# Patient Record
Sex: FEMALE | Race: WHITE | URBAN - METROPOLITAN AREA
[De-identification: names, ages, dates, MRNs, and addresses within clinical notes are randomized per-mention and may not be internally consistent; named-entity substitution may affect disease eponyms.]

---

## 2017-12-01 ENCOUNTER — HOSPITAL ENCOUNTER (INPATIENT)
Facility: CLINIC | Age: 72
LOS: 6 days | Discharge: HOME HEALTH | DRG: 029 | End: 2017-12-07
Attending: INTERNAL MEDICINE | Admitting: INTERNAL MEDICINE
Payer: MEDICARE

## 2017-12-01 ENCOUNTER — TRANSFERRED RECORDS (OUTPATIENT)
Dept: HEALTH INFORMATION MANAGEMENT | Facility: CLINIC | Age: 72
End: 2017-12-01

## 2017-12-01 DIAGNOSIS — E87.6 HYPOKALEMIA: ICD-10-CM

## 2017-12-01 DIAGNOSIS — G06.2 EPIDURAL ABSCESS: Primary | ICD-10-CM

## 2017-12-01 PROBLEM — T14.8XXA HEMATOMA: Status: ACTIVE | Noted: 2017-12-01

## 2017-12-01 LAB
GLUCOSE BLDC GLUCOMTR-MCNC: 184 MG/DL (ref 70–99)
GLUCOSE BLDC GLUCOMTR-MCNC: 209 MG/DL (ref 70–99)

## 2017-12-01 PROCEDURE — 99223 1ST HOSP IP/OBS HIGH 75: CPT | Mod: AI | Performed by: PHYSICIAN ASSISTANT

## 2017-12-01 PROCEDURE — 87040 BLOOD CULTURE FOR BACTERIA: CPT | Performed by: PHYSICIAN ASSISTANT

## 2017-12-01 PROCEDURE — 25000131 ZZH RX MED GY IP 250 OP 636 PS 637: Mod: GY | Performed by: PHYSICIAN ASSISTANT

## 2017-12-01 PROCEDURE — 00000146 ZZHCL STATISTIC GLUCOSE BY METER IP

## 2017-12-01 PROCEDURE — 12000000 ZZH R&B MED SURG/OB

## 2017-12-01 PROCEDURE — 36415 COLL VENOUS BLD VENIPUNCTURE: CPT | Performed by: PHYSICIAN ASSISTANT

## 2017-12-01 RX ORDER — PROCHLORPERAZINE MALEATE 5 MG
5 TABLET ORAL EVERY 6 HOURS PRN
Status: DISCONTINUED | OUTPATIENT
Start: 2017-12-01 | End: 2017-12-07 | Stop reason: HOSPADM

## 2017-12-01 RX ORDER — LISINOPRIL 40 MG/1
40 TABLET ORAL DAILY
Status: DISCONTINUED | OUTPATIENT
Start: 2017-12-02 | End: 2017-12-05

## 2017-12-01 RX ORDER — NALOXONE HYDROCHLORIDE 0.4 MG/ML
.1-.4 INJECTION, SOLUTION INTRAMUSCULAR; INTRAVENOUS; SUBCUTANEOUS
Status: DISCONTINUED | OUTPATIENT
Start: 2017-12-01 | End: 2017-12-07

## 2017-12-01 RX ORDER — POLYETHYLENE GLYCOL 3350 17 G/17G
17 POWDER, FOR SOLUTION ORAL DAILY PRN
Status: DISCONTINUED | OUTPATIENT
Start: 2017-12-01 | End: 2017-12-07 | Stop reason: HOSPADM

## 2017-12-01 RX ORDER — AMOXICILLIN 250 MG
2 CAPSULE ORAL 2 TIMES DAILY PRN
Status: DISCONTINUED | OUTPATIENT
Start: 2017-12-01 | End: 2017-12-07 | Stop reason: HOSPADM

## 2017-12-01 RX ORDER — FUROSEMIDE 40 MG
40 TABLET ORAL DAILY
Status: DISCONTINUED | OUTPATIENT
Start: 2017-12-02 | End: 2017-12-04

## 2017-12-01 RX ORDER — CLONIDINE HYDROCHLORIDE 0.1 MG/1
0.1 TABLET ORAL 2 TIMES DAILY
Status: DISCONTINUED | OUTPATIENT
Start: 2017-12-02 | End: 2017-12-07 | Stop reason: HOSPADM

## 2017-12-01 RX ORDER — CARVEDILOL 25 MG/1
25 TABLET ORAL 2 TIMES DAILY
Status: DISCONTINUED | OUTPATIENT
Start: 2017-12-02 | End: 2017-12-07 | Stop reason: HOSPADM

## 2017-12-01 RX ORDER — ACETAMINOPHEN 650 MG/1
650 SUPPOSITORY RECTAL EVERY 4 HOURS PRN
Status: DISCONTINUED | OUTPATIENT
Start: 2017-12-01 | End: 2017-12-07 | Stop reason: HOSPADM

## 2017-12-01 RX ORDER — HYDRALAZINE HYDROCHLORIDE 20 MG/ML
10 INJECTION INTRAMUSCULAR; INTRAVENOUS EVERY 4 HOURS PRN
Status: DISCONTINUED | OUTPATIENT
Start: 2017-12-01 | End: 2017-12-07 | Stop reason: HOSPADM

## 2017-12-01 RX ORDER — ACETAMINOPHEN 325 MG/1
650 TABLET ORAL EVERY 4 HOURS PRN
Status: DISCONTINUED | OUTPATIENT
Start: 2017-12-01 | End: 2017-12-07 | Stop reason: HOSPADM

## 2017-12-01 RX ORDER — NIFEDIPINE 90 MG/1
90 TABLET, EXTENDED RELEASE ORAL DAILY
Status: DISCONTINUED | OUTPATIENT
Start: 2017-12-02 | End: 2017-12-07 | Stop reason: HOSPADM

## 2017-12-01 RX ORDER — BISACODYL 10 MG
10 SUPPOSITORY, RECTAL RECTAL DAILY PRN
Status: DISCONTINUED | OUTPATIENT
Start: 2017-12-01 | End: 2017-12-07 | Stop reason: HOSPADM

## 2017-12-01 RX ORDER — HYDROMORPHONE HYDROCHLORIDE 1 MG/ML
0.2 INJECTION, SOLUTION INTRAMUSCULAR; INTRAVENOUS; SUBCUTANEOUS
Status: DISCONTINUED | OUTPATIENT
Start: 2017-12-01 | End: 2017-12-04

## 2017-12-01 RX ORDER — PROCHLORPERAZINE 25 MG
12.5 SUPPOSITORY, RECTAL RECTAL EVERY 12 HOURS PRN
Status: DISCONTINUED | OUTPATIENT
Start: 2017-12-01 | End: 2017-12-07 | Stop reason: HOSPADM

## 2017-12-01 RX ORDER — NICOTINE POLACRILEX 4 MG
15-30 LOZENGE BUCCAL
Status: DISCONTINUED | OUTPATIENT
Start: 2017-12-01 | End: 2017-12-07 | Stop reason: HOSPADM

## 2017-12-01 RX ORDER — DEXTROSE MONOHYDRATE 25 G/50ML
25-50 INJECTION, SOLUTION INTRAVENOUS
Status: DISCONTINUED | OUTPATIENT
Start: 2017-12-01 | End: 2017-12-07 | Stop reason: HOSPADM

## 2017-12-01 RX ORDER — OXYCODONE HYDROCHLORIDE 5 MG/1
5-10 TABLET ORAL
Status: DISCONTINUED | OUTPATIENT
Start: 2017-12-01 | End: 2017-12-07 | Stop reason: HOSPADM

## 2017-12-01 RX ORDER — AMOXICILLIN 250 MG
1 CAPSULE ORAL 2 TIMES DAILY PRN
Status: DISCONTINUED | OUTPATIENT
Start: 2017-12-01 | End: 2017-12-07 | Stop reason: HOSPADM

## 2017-12-01 RX ORDER — ONDANSETRON 2 MG/ML
4 INJECTION INTRAMUSCULAR; INTRAVENOUS EVERY 6 HOURS PRN
Status: DISCONTINUED | OUTPATIENT
Start: 2017-12-01 | End: 2017-12-07 | Stop reason: HOSPADM

## 2017-12-01 RX ORDER — ONDANSETRON 4 MG/1
4 TABLET, ORALLY DISINTEGRATING ORAL EVERY 6 HOURS PRN
Status: DISCONTINUED | OUTPATIENT
Start: 2017-12-01 | End: 2017-12-07 | Stop reason: HOSPADM

## 2017-12-01 RX ADMIN — INSULIN ASPART 1 UNITS: 100 INJECTION, SOLUTION INTRAVENOUS; SUBCUTANEOUS at 22:29

## 2017-12-01 ASSESSMENT — VISUAL ACUITY: OU: NORMAL ACUITY

## 2017-12-01 ASSESSMENT — ACTIVITIES OF DAILY LIVING (ADL): ADLS_ACUITY_SCORE: 11

## 2017-12-01 NOTE — IP AVS SNAPSHOT
MRN:[id number]                      After Visit Summary   12/1/2017    Mary Baker    MRN: [id number]           Thank you!     Thank you for choosing [facility] for your care. Our goal is always to provide you with excellent care. Hearing back from our patients is one way we can continue to improve our services. Please take a few minutes to complete the written survey that you may receive in the mail after you visit with us. Thank you!        Patient Information     Date Of Birth          1945        Designated Caregiver       Most Recent Value    Caregiver    Will someone help with your care after discharge? yes    Name of designated caregiver deuce    Phone number of caregiver [phone]    Caregiver address [id number] [place] [address]      About your hospital stay     You were admitted on:  December 1, 2017 You last received care in the:  [facility] [address] [facility]    You were discharged on:  December 7, 2017        Reason for your hospital stay       For surgical treatment of epidural spine abscess                  Who to Call     For medical emergencies, please call 911.  For non-urgent questions about your medical care, please call your primary care provider or clinic, [phone]  For questions related to your surgery, please call your surgery clinic        Attending Provider     Provider Specialty    Elbert Reed MD Internal Medicine    Magaly Hoover MD Internal Medicine    Vishal Jay MD Orthopedics       Primary Care Provider Office Phone # Fax #    Vishal Gracia [phone]      After Care Instructions     Activity       Your activity upon discharge: follow Dr. Jay d/c instruction sheet            Diet       Follow this diet upon discharge: Orders Placed This Encounter      Moderate Consistent CHO Diet            Discharge Instructions           Supplies       List the supplies the pt needs to go  home  4x4s and tape            Wound care and dressings       Instructions to care for your wound at home: change dressing daily, keep clean and dry, follow up with Dr. Jay in 2 weeks for staple removal, wear brace when up                  Follow-up Appointments     Follow Up and recommended labs and tests       Follow up with primary care provider, Vishal Gracia, within 7 days for hospital follow- up.  The following labs/tests are recommended: CBC and BMP.            Follow Up and recommended labs and tests       Your appointments are scheduled as follows (you need weekly CBC & BMPs, so please be sure the doctors write orders to have these drawn at the appointment):    Wed Dec 13 at 8:30am   Dr. Anthony, Primary Care     [facility]     [address]     [place] [address]     [phone]    Wed Dec 20 at 10:45am  Dr. Perez, Infectious Disease     [facility]     [address]      [place] [address] [phone]    Thurs Dec 21 at 9:40am   Dr. Jay, Orthopedic Surgery     [address] [place] [address], [address]     [place], [place]   [phone]            Follow-up and recommended labs and tests        Follow up with Dr. Perez of ID , at (location with clinic name or city) [facility], within 1-2 weeks  for hospital follow- up. The following labs/tests are recommended: CBC.                  Additional Services     Home infusion referral       Your provider has referred you to: [facility]: [facility] [phone]   [email or web address]    Local Address (if different from home address):     Anticipated Length of Therapy: 6-8 weeks    Home Infusion Pharmacist to adjust therapy based on labs and clinical assessments: Yes    Labs:  May draw labs from Venous Catheter: Yes  Home Infusion Pharmacist to order labs based on therapy type and clinical assessments: Yes  Call/Fax Lab Results to: Dr. Perez of ID    Agency Staff to assess nursing needs  for Infusion Therapy.    Access Device Management:  IV Access Type: PICC  Flush with Heparin and Normal Saline IVP PRN and routine site care (per agency protocol) to maintain access device? Yes                  Further instructions from your care team       Your doctor has ordered IV antibiotics after your hospital stay.  This service will be provided by [facility]. They will contact you regarding your first visit.   If you have any questions about this service, please call them at [phone].    A follow-up appointment was scheduled for you [see above].  It is very important to go to it--bring these papers and your insurance card with you.  At the visit, talk about your hospital stay.  Tell your doctor how you feel.  Show your new list of medications.  Your doctor will update records, make sure you are still doing OK, and decide if any tests or medication changes are needed.                  Care after a Discectomy/Decompression - Dr. Vishal Jay    The following information will help you through your recovery at home.  Pain    It is normal for you to experience some pain in the area of your incision after your surgery.  Some of your leg pain may go away immediately after your surgery.  Some of the pain will gradually decrease over the next few days or weeks depending on the amount of inflammation present in the nerve.      Sometimes Dr. Jay will place a steroid preparation on the nerve during surgery.   This may help decrease the nerve inflammation for the first few days after surgery.  If some of your leg pain returns 3 to 5 days after surgery it may be due to the steroid wearing off.  The pain should not be as bad as your pre-op pain and will diminish over a period of weeks.      You should call Dr. Jay s office if your leg pain returns suddenly and does not improve over 24 hours.    Activity    You may increase your activity as tolerated; walking is the best form of exercise after spine  surgery.      Avoid bending, lifting, and twisting (BLT).  Avoid activities such as vacuuming, raking and shoveling.    Try to limit your lifting to 10-15lbs during the first 2 weeks and then increase to 20-25lbs over the next 6 weeks.    You may return to work approximately 1- 2 weeks after your surgery, if you have a sedentary or desk type job.  If you have a physical job Dr. Jay and his staff will help you determine a return to work plan.      You may resume sexual activity when you feel ready.  Stop if you have pain.    Driving    You may drive if you feel strong enough and are not taking any narcotic pain medications.    Incision Site     The first 3 days after surgery Dr. Jay would like you to keep your incision dry.   You may take a sponge bath during this time or cover your dressing with a transparent material called Tegaderm (sold at most pharmacies).      The first 3 days after surgery you should change your gauze dressing at least once a day.  After 3 days you may remove the gauze dressing and leave it off, at this point you may shower without covering your incision, allow water and soap to run over your incision but do not scrub the area or soak in a tub.    Your incision is covered with steri-strips (narrow white tapes); they will get loose and fall of in 7-10 days.  If they do not fall off after 10 days you may remove them or Dr. Jay s staff will remove them at your post-op visit.    Most patients have dissolvable stitches which do not need to be removed.  In some cases nylon stitches are used and they need to be removed, 10-14 days after surgery.  If you have staples or nylon sutures please call for a removal appointment with Dr. Jay s nurse.    Pain Management     Take your prescribed pain medication as needed and directed.  Use Tylenol and Ibuprofen for your discomfort when you no longer need the narcotic pain medication.      If you need a refill on your pain medication call [phone],  please allow 24 hours for your prescription to be refilled, Dr. Jay s office does not refill pain medications on Friday.   Diet     Eat a healthy diet; this will help your recovery.    Drink plenty of fluids, water, milk or juice.    If you have trouble with constipation you should eat more fiber, drink more fluids, increase your walking or try an over the counter laxative.    Follow-up Visits    You should have a post-op appointment that was scheduled for you at the same time your surgery was scheduled. If you do not, please call Dr. Pierre gonzalez office when you get home from your surgery.    Write down any questions you have about your surgery, recovery, return to work and other topics you wished to be covered at your post-op visit.  This way, we will be able to address all of your questions at your next visit.    Call Dr. Pierre gonzalez office if you have any questions or concerns.    When to Call your Doctor:    If you have any redness, warmth or swelling at the incision site.    If your incision opens up.    If you have increasing drainage from your incision.    If you have a temperature greater than 100.5 degrees Fahrenheit.    [address] [place] [address]  Phone: [phone]  Fax: [phone]  Web site: [email or web address]      Pending Results     Date and Time Order Name Status Description    12/2/2017 1314 Anaerobic bacterial culture Preliminary     12/2/2017 1314 Abscess Culture Aerobic Bacterial Preliminary             Statement of Approval     Ordered          12/07/17 1031  I have reviewed and agree with all the recommendations and orders detailed in this document.  EFFECTIVE NOW     Approved and electronically signed by:  Dejah Mesa PA-C             Admission Information     Date & Time Provider Department Dept. Phone    12/1/2017 Vishal Jay MD [facility] [address] [facility] [phone]      Your Vitals Were     Blood Pressure Pulse Temperature Respirations Height Weight     "156/83 (BP Location: Left arm) 76 98  F (36.7  C) (Oral) 16 1.753 m (5' 9\") 109 kg (240 lb 4.8 oz)    Pulse Oximetry BMI (Body Mass Index)                98% 35.49 kg/m2          The Training Room (TTR) Information     The Training Room (TTR) lets you send messages to your doctor, view your test results, renew your prescriptions, schedule appointments and more. To sign up, go to [email or web address] ([email or web address]) . Click on \"Log in\" on the left side of the screen, which will take you to the Welcome page. Then click on \"Sign up Now\" on the right side of the page.     You will be asked to enter the access code listed below, as well as some personal information. Please follow the directions to create your username and password.     Your access code is: [email or web address]  Expires: 3/6/2018 11:06 AM     Your access code will  in 90 days. If you need help or a new code, please call your [place] clinic or [phone].        Care EveryWhere ID     This is your Care EveryWhere ID. This could be used by other organizations to access your [place] medical records  [id number]        Equal Access to Services     NIRAV RINCON : Lux Remy, leigh hart, qaaliyah kaalmada celso, david wheatley. So [facility] [phone].    ATENCIÓN: Si habla español, tiene a saravia disposición servicios gratuitos de asistencia lingüística. Llame al [phone].    We comply with applicable federal civil rights laws and [place] laws. We do not discriminate on the basis of race, color, national origin, age, disability, sex, sexual orientation, or gender identity.               Review of your medicines      START taking        Dose / Directions    Nafcillin Sodium 2 G Solr        Dose:  2 g   Inject 2 g into the vein every 4 hours weekly CBC, creatinine and ALT faxed to [phone]   Refills:  0       oxyCODONE IR 5 MG tablet   Commonly known as:  ROXICODONE        Dose:  5-10 mg   Take 1-2 tablets (5-10 mg) by mouth every 3 hours as needed " for moderate to severe pain   Quantity:  25 tablet   Refills:  0       Potassium Chloride ER 20 MEQ Tbcr   Used for:  Hypokalemia        Dose:  20 mEq   Take 1 tablet (20 mEq) by mouth daily   Quantity:  30 tablet   Refills:  0       rifampin 300 MG capsule   Commonly known as:  RIFADIN        Dose:  300 mg   Take 1 capsule (300 mg) by mouth 2 times daily   Quantity:  60 capsule   Refills:  1         CONTINUE these medicines which have NOT CHANGED        Dose / Directions    ACCU-CHEK SHANTEL test strip   Used for:  Type II or unspecified type diabetes mellitus without mention of complication, not stated as uncontrolled   Generic drug:  blood glucose monitoring        Pt tests 4 times daily before meals and nightly   Quantity:  450 strip   Refills:  prn       aspirin 81 MG tablet   Notes to Patient:  Resume 12/8        Dose:  1 tablet   Take 1 tablet by mouth every evening   Refills:  3       Blood Pressure Monitor Kit   Used for:  Hypertension        Dose:  1 Device   1 Device daily. Check BP daily   Quantity:  1 kit   Refills:  0       CARVEDILOL PO   Notes to Patient:  12/7 at bedtime        Dose:  25 mg   Take 25 mg by mouth 2 times daily In the Afternoon and at Bedtime   Refills:  0       * CLONIDINE HCL PO        Dose:  0.1 mg   Take 0.1 mg by mouth daily Patient takes 0.1mg in the afternoon and 0.2mg at bedtime   Refills:  0       * CLONIDINE HCL PO        Dose:  0.2 mg   Take 0.2 mg by mouth At Bedtime Patient takes 0.1mg in the afternoon and 0.2mg at bedtime   Refills:  0       FLUOXETINE HCL PO   Notes to Patient:  12/8        Dose:  20 mg   Take 20 mg by mouth daily   Refills:  0       FUROSEMIDE PO        Dose:  40 mg   Take 40 mg by mouth daily (Takes 2 x 20mg tablet)   Refills:  0       insulin pen needle 31G X 8 MM   Commonly known as:  ULTICARE SHORT   Used for:  Diabetes mellitus, type 2 (H)        Use BID   Quantity:  100 each   Refills:  11       LANTUS SOLOSTAR 100 UNIT/ML injection   Used for:   Type 2 diabetes, HbA1c goal < 7% (H)   Generic drug:  insulin glargine   Notes to Patient:  12/8        Dose:  47 Units   Inject 47 Units Subcutaneous every morning   Quantity:  1 Month   Refills:  5       LEVOTHYROXINE SODIUM PO        Dose:  175 mcg   Take 175 mcg by mouth daily   Refills:  0       LORAZEPAM PO        Dose:  1 mg   Take 1 mg by mouth 2 times daily as needed   Refills:  0       METFORMIN HCL PO        Dose:  1000 mg   Take 1,000 mg by mouth 2 times daily (with meals) takes 2 x 500mg tablets   Refills:  0       OMEGA-3 FISH OIL PO        Dose:  1 capsule   Take 1 capsule by mouth twice a week   Refills:  0       PRAVASTATIN SODIUM PO        Dose:  40 mg   Take 40 mg by mouth every evening   Refills:  0       TYLENOL PO        Dose:  1500 mg   Take 1,500 mg by mouth daily as needed for mild pain or fever   Refills:  0       * Notice:  This list has 2 medication(s) that are the same as other medications prescribed for you. Read the directions carefully, and ask your doctor or other care provider to review them with you.      STOP taking     BENAZEPRIL HCL PO                Where to get your medicines      These medications were sent to [facility] [place]  [place], [place] - [address]  [address] [facility] [address], [facility] [address]     Phone:  [phone]     Potassium Chloride ER 20 MEQ Tbcr    rifampin 300 MG capsule         Some of these will need a paper prescription and others can be bought over the counter. Ask your nurse if you have questions.     Bring a paper prescription for each of these medications     oxyCODONE IR 5 MG tablet       You don't need a prescription for these medications     Nafcillin Sodium 2 G Solr               ANTIBIOTIC INSTRUCTION     You've Been Prescribed an Antibiotic - Now What?  Your healthcare team thinks that you or your loved one might have an infection. Some infections can be treated with antibiotics, which are powerful, life-saving drugs. Like all  medications, antibiotics have side effects and should only be used when necessary. There are some important things you should know about your antibiotic treatment.      Your healthcare team may run tests before you start taking an antibiotic.    Your team may take samples (e.g., from your blood, urine or other areas) to run tests to look for bacteria. These test can be important to determine if you need an antibiotic at all and, if you do, which antibiotic will work best.      Within a few days, your healthcare team might change or even stop your antibiotic.    Your team may start you on an antibiotic while they are working to find out what is making you sick.    Your team might change your antibiotic because test results show that a different antibiotic would be better to treat your infection.    In some cases, once your team has more information, they learn that you do not need an antibiotic at all. They may find out that you don't have an infection, or that the antibiotic you're taking won't work against your infection. For example, an infection caused by a virus can't be treated with antibiotics. Staying on an antibiotic when you don't need it is more likely to be harmful than helpful.      You may experience side effects from your antibiotic.    Like all medications, antibiotics have side effects. Some of these can be serious.    Let you healthcare team know if you have any known allergies when you are admitted to the hospital.    One significant side effect of nearly all antibiotics is the risk of severe and sometimes deadly diarrhea caused by Clostridium difficile (C. Difficile). This occurs when a person takes antibiotics because some good germs are destroyed. Antibiotic use allows C. diificile to take over, putting patients at high risk for this serious infection.    As a patient or caregiver, it is important to understand your or your loved one's antibiotic treatment. It is especially important for  caregivers to speak up when patients can't speak for themselves. Here are some important questions to ask your healthcare team.    What infection is this antibiotic treating and how do you know I have that infection?    What side effects might occur from this antibiotic?    How long will I need to take this antibiotic?    Is it safe to take this antibiotic with other medications or supplements (e.g., vitamins) that I am taking?     Are there any special directions I need to know about taking this antibiotic? For example, should I take it with food?    How will I be monitored to know whether my infection is responding to the antibiotic?    What tests may help to make sure the right antibiotic is prescribed for me?      Information provided by:  www.cdc.gov/getsmart  U.S. Department of Health and Human Services  Centers for disease Control and Prevention  National Center for Emerging and Zoonotic Infectious Diseases  Division of Healthcare Quality Promotion         Protect others around you: Learn how to safely use, store and throw away your medicines at www.disposemymeds.org.             Medication List: This is a list of all your medications and when to take them. Check marks below indicate your daily home schedule. Keep this list as a reference.      Medications           Morning Afternoon Evening Bedtime As Needed    ACCU-CHEK SHANTEL test strip   Pt tests 4 times daily before meals and nightly   Generic drug:  blood glucose monitoring                                aspirin 81 MG tablet   Take 1 tablet by mouth every evening   Notes to Patient:  Resume 12/8                                   Blood Pressure Monitor Kit   1 Device daily. Check BP daily                                CARVEDILOL PO   Take 25 mg by mouth 2 times daily In the Afternoon and at Bedtime   Last time this was given:  25 mg on 12/7/2017  8:17 AM   Notes to Patient:  12/7 at bedtime                                      * CLONIDINE HCL PO   Take  0.1 mg by mouth daily Patient takes 0.1mg in the afternoon and 0.2mg at bedtime   Last time this was given:  0.1 mg on 12/7/2017  8:17 AM                                * CLONIDINE HCL PO   Take 0.2 mg by mouth At Bedtime Patient takes 0.1mg in the afternoon and 0.2mg at bedtime   Last time this was given:  0.1 mg on 12/7/2017  8:17 AM   Next Dose Due:  12/7 at bedtime                                      FLUOXETINE HCL PO   Take 20 mg by mouth daily   Last time this was given:  20 mg on 12/7/2017  8:17 AM   Notes to Patient:  12/8                                   FUROSEMIDE PO   Take 40 mg by mouth daily (Takes 2 x 20mg tablet)   Last time this was given:  40 mg on 12/3/2017  8:29 AM   Next Dose Due:  Resume 12/8                                   insulin pen needle 31G X 8 MM   Commonly known as:  ULTICARE SHORT   Use BID                                LANTUS SOLOSTAR 100 UNIT/ML injection   Inject 47 Units Subcutaneous every morning   Generic drug:  insulin glargine   Notes to Patient:  12/8                                   LEVOTHYROXINE SODIUM PO   Take 175 mcg by mouth daily   Last time this was given:  175 mcg on 12/7/2017  8:17 AM   Next Dose Due:  12/8                                   LORAZEPAM PO   Take 1 mg by mouth 2 times daily as needed   Next Dose Due:  Available as needed                                   METFORMIN HCL PO   Take 1,000 mg by mouth 2 times daily (with meals) takes 2 x 500mg tablets   Next Dose Due:  12/7 with dinner                                        Nafcillin Sodium 2 G Solr   Inject 2 g into the vein every 4 hours weekly CBC, creatinine and ALT faxed to [phone]   Last time this was given:  12/7 at 12:20 pm   Next Dose Due:  12/7 4 pm                                            OMEGA-3 FISH OIL PO   Take 1 capsule by mouth twice a week   Next Dose Due:  Resume home schedule                                oxyCODONE IR 5 MG tablet   Commonly known as:  ROXICODONE   Take 1-2  tablets (5-10 mg) by mouth every 3 hours as needed for moderate to severe pain   Last time this was given:  5 mg on 12/5/2017  2:28 AM   Next Dose Due:  Available as needed                                   Potassium Chloride ER 20 MEQ Tbcr   Take 1 tablet (20 mEq) by mouth daily   Last time this was given:  12/7   Next Dose Due:  12/8                                   PRAVASTATIN SODIUM PO   Take 40 mg by mouth every evening   Next Dose Due:  12/7 at bedtime                                   rifampin 300 MG capsule   Commonly known as:  RIFADIN   Take 1 capsule (300 mg) by mouth 2 times daily   Next Dose Due:  Start this evening (12/7) and then take 2x a day                                      TYLENOL PO   Take 1,500 mg by mouth daily as needed for mild pain or fever   Last time this was given:  650 mg on 12/5/2017  9:16 PM   Next Dose Due:  Available as needed                                   * Notice:  This list has 2 medication(s) that are the same as other medications prescribed for you. Read the directions carefully, and ask your doctor or other care provider to review them with you.

## 2017-12-01 NOTE — LETTER
Transition Communication Hand-off for Care Transitions to Next Level of Care Provider    Name: Mary Baker  MRN #: [id number]  Primary Care Provider: Vishal Gracia     Primary Clinic: [facility] [address] [place] [address]     Reason for Hospitalization:  Spinal epidural abscess  Hematoma  EPIDURAL ABCESS  Epidural abscess  UNKNOWN  EPIDURAL ABCESS STATUS POST OP,PREVIOUS I&D  Admit Date/Time: 12/1/2017  6:40 PM  Discharge Date: 12/07/17  Payor Source: Payor: BCBS / Plan: BCBS PLATINUM BLUE / Product Type: PPO /   Discharge Plan:  Discharge Plan:       Most Recent Value    Disposition Comments home with q4h IV abx    Concerns To Be Addressed discharge planning concerns, financial/insurance concerns         Discharge Needs Assessment:  Needs       Most Recent Value    Transportation Available family or friend will provide, car    # of Referrals Placed by [facility], Scheduled Follow-up appointments, Specialty Providers, External Care Coordination, MTM, Communication hand-offs to next level of Care Providers        Follow-up specialty is recommended: Yes--PCP, Infectious Disease, and Spinal Surgeon  Follow-up plan:  future appointments.  Your appointments are scheduled as follows (you need weekly CBC & BMPs, so please be sure the doctors write orders to have these drawn at the appointment):     Wed Dec 13 at 8:30am   Dr. Anthony, Primary Care      [facility]      [address] [place] [address]    [phone]     Wed Dec 20 at 10:45am   Dr. Perez, Infectious Disease      [facility]      [address] [facility] [address]      [facility] [address] [phone]     Thurs Dec 21 at 9:40am   Dr. Jay, Orthopedic Surgery      [address] [place] [address], [address]      [place], [place]   [phone]     Any outstanding tests or procedures:        Referrals     Future Labs/Procedures    Home infusion referral     Comments:    Your provider has referred you to: [facility]: [facility]  [facility] [phone]   [email or web address]    Local Address (if different from home address):     Anticipated Length of Therapy: 6-8 weeks    Home Infusion Pharmacist to adjust therapy based on labs and clinical assessments: Yes    Labs:  May draw labs from Venous Catheter: Yes  Home Infusion Pharmacist to order labs based on therapy type and clinical assessments: Yes  Call/Fax Lab Results to: Dr. Perez of ID    Agency Staff to assess nursing needs for Infusion Therapy.    Access Device Management:  IV Access Type: PICC  Flush with Heparin and Normal Saline IVP PRN and routine site care (per agency protocol) to maintain access device? Yes    MED THERAPY MANAGE REFERRAL     Comments:    Patient has diagnosis of Diabetes, Heart Failure, COPD, or AMI and is on more than 5 medications          Key Recommendations:  Please be aware of pt's recent admission.     Joselin Mckeon RN BSN PHN   [facility] Care Coordination    AVS/Discharge Summary is the source of truth; this is a helpful guide for improved communication of patient story

## 2017-12-01 NOTE — IP AVS SNAPSHOT
[address] [facility]    [address]    [place] [address]    Phone:  [phone]                                       After Visit Summary   12/1/2017    Mary Baker    MRN: [id number]           After Visit Summary Signature Page     I have received my discharge instructions, and my questions have been answered. I have discussed any challenges I see with this plan with the nurse or doctor.    ..........................................................................................................................................  Patient/Patient Representative Signature      ..........................................................................................................................................  Patient Representative Print Name and Relationship to Patient    ..................................................               ................................................  Date                                            Time    ..........................................................................................................................................  Reviewed by Signature/Title    ...................................................              ..............................................  Date                                                            Time

## 2017-12-02 ENCOUNTER — ANESTHESIA EVENT (OUTPATIENT)
Dept: SURGERY | Facility: CLINIC | Age: 72
DRG: 029 | End: 2017-12-02
Payer: MEDICARE

## 2017-12-02 ENCOUNTER — APPOINTMENT (OUTPATIENT)
Dept: CT IMAGING | Facility: CLINIC | Age: 72
DRG: 029 | End: 2017-12-02
Attending: ORTHOPAEDIC SURGERY
Payer: MEDICARE

## 2017-12-02 ENCOUNTER — APPOINTMENT (OUTPATIENT)
Dept: GENERAL RADIOLOGY | Facility: CLINIC | Age: 72
DRG: 029 | End: 2017-12-02
Attending: ORTHOPAEDIC SURGERY
Payer: MEDICARE

## 2017-12-02 ENCOUNTER — ANESTHESIA (OUTPATIENT)
Dept: SURGERY | Facility: CLINIC | Age: 72
DRG: 029 | End: 2017-12-02
Payer: MEDICARE

## 2017-12-02 PROBLEM — G06.2 EPIDURAL ABSCESS: Status: ACTIVE | Noted: 2017-12-02

## 2017-12-02 LAB
ANION GAP SERPL CALCULATED.3IONS-SCNC: 7 MMOL/L (ref 3–14)
BASOPHILS # BLD AUTO: 0 10E9/L (ref 0–0.2)
BASOPHILS NFR BLD AUTO: 0.1 %
BUN SERPL-MCNC: 20 MG/DL (ref 7–30)
CALCIUM SERPL-MCNC: 8.9 MG/DL (ref 8.5–10.1)
CHLORIDE SERPL-SCNC: 100 MMOL/L (ref 94–109)
CO2 SERPL-SCNC: 29 MMOL/L (ref 20–32)
CREAT SERPL-MCNC: 1.86 MG/DL (ref 0.52–1.04)
DIFFERENTIAL METHOD BLD: ABNORMAL
EOSINOPHIL # BLD AUTO: 0 10E9/L (ref 0–0.7)
EOSINOPHIL NFR BLD AUTO: 0.1 %
ERYTHROCYTE [DISTWIDTH] IN BLOOD BY AUTOMATED COUNT: 12.9 % (ref 10–15)
GFR SERPL CREATININE-BSD FRML MDRD: 27 ML/MIN/1.7M2
GLUCOSE BLDC GLUCOMTR-MCNC: 144 MG/DL (ref 70–99)
GLUCOSE BLDC GLUCOMTR-MCNC: 146 MG/DL (ref 70–99)
GLUCOSE BLDC GLUCOMTR-MCNC: 178 MG/DL (ref 70–99)
GLUCOSE BLDC GLUCOMTR-MCNC: 185 MG/DL (ref 70–99)
GLUCOSE BLDC GLUCOMTR-MCNC: 202 MG/DL (ref 70–99)
GLUCOSE BLDC GLUCOMTR-MCNC: 222 MG/DL (ref 70–99)
GLUCOSE SERPL-MCNC: 244 MG/DL (ref 70–99)
GRAM STN SPEC: ABNORMAL
HBA1C MFR BLD: 6.7 % (ref 4.3–6)
HCT VFR BLD AUTO: 32.3 % (ref 35–47)
HGB BLD-MCNC: 11 G/DL (ref 11.7–15.7)
IMM GRANULOCYTES # BLD: 0 10E9/L (ref 0–0.4)
IMM GRANULOCYTES NFR BLD: 0.2 %
LYMPHOCYTES # BLD AUTO: 0.8 10E9/L (ref 0.8–5.3)
LYMPHOCYTES NFR BLD AUTO: 4.3 %
Lab: ABNORMAL
MCH RBC QN AUTO: 29.3 PG (ref 26.5–33)
MCHC RBC AUTO-ENTMCNC: 34.1 G/DL (ref 31.5–36.5)
MCV RBC AUTO: 86 FL (ref 78–100)
MONOCYTES # BLD AUTO: 1.8 10E9/L (ref 0–1.3)
MONOCYTES NFR BLD AUTO: 9.9 %
NEUTROPHILS # BLD AUTO: 15.4 10E9/L (ref 1.6–8.3)
NEUTROPHILS NFR BLD AUTO: 85.4 %
NRBC # BLD AUTO: 0 10*3/UL
NRBC BLD AUTO-RTO: 0 /100
PLATELET # BLD AUTO: 342 10E9/L (ref 150–450)
POTASSIUM SERPL-SCNC: 3.9 MMOL/L (ref 3.4–5.3)
RBC # BLD AUTO: 3.75 10E12/L (ref 3.8–5.2)
SODIUM SERPL-SCNC: 136 MMOL/L (ref 133–144)
SPECIMEN SOURCE: ABNORMAL
WBC # BLD AUTO: 18 10E9/L (ref 4–11)

## 2017-12-02 PROCEDURE — 36415 COLL VENOUS BLD VENIPUNCTURE: CPT | Performed by: PHYSICIAN ASSISTANT

## 2017-12-02 PROCEDURE — 71000012 ZZH RECOVERY PHASE 1 LEVEL 1 FIRST HR: Performed by: ORTHOPAEDIC SURGERY

## 2017-12-02 PROCEDURE — 25000128 H RX IP 250 OP 636: Performed by: ANESTHESIOLOGY

## 2017-12-02 PROCEDURE — 37000008 ZZH ANESTHESIA TECHNICAL FEE, 1ST 30 MIN: Performed by: ORTHOPAEDIC SURGERY

## 2017-12-02 PROCEDURE — 87205 SMEAR GRAM STAIN: CPT | Performed by: ORTHOPAEDIC SURGERY

## 2017-12-02 PROCEDURE — 80048 BASIC METABOLIC PNL TOTAL CA: CPT | Performed by: PHYSICIAN ASSISTANT

## 2017-12-02 PROCEDURE — 27210995 ZZH RX 272: Performed by: ORTHOPAEDIC SURGERY

## 2017-12-02 PROCEDURE — 00BT0ZZ EXCISION OF SPINAL MENINGES, OPEN APPROACH: ICD-10-PCS | Performed by: ORTHOPAEDIC SURGERY

## 2017-12-02 PROCEDURE — 87075 CULTR BACTERIA EXCEPT BLOOD: CPT | Performed by: ORTHOPAEDIC SURGERY

## 2017-12-02 PROCEDURE — 72100 X-RAY EXAM L-S SPINE 2/3 VWS: CPT

## 2017-12-02 PROCEDURE — 93005 ELECTROCARDIOGRAM TRACING: CPT

## 2017-12-02 PROCEDURE — 37000009 ZZH ANESTHESIA TECHNICAL FEE, EACH ADDTL 15 MIN: Performed by: ORTHOPAEDIC SURGERY

## 2017-12-02 PROCEDURE — 83036 HEMOGLOBIN GLYCOSYLATED A1C: CPT | Performed by: PHYSICIAN ASSISTANT

## 2017-12-02 PROCEDURE — 0SB00ZZ EXCISION OF LUMBAR VERTEBRAL JOINT, OPEN APPROACH: ICD-10-PCS | Performed by: ORTHOPAEDIC SURGERY

## 2017-12-02 PROCEDURE — 25000128 H RX IP 250 OP 636: Performed by: PHYSICIAN ASSISTANT

## 2017-12-02 PROCEDURE — A9270 NON-COVERED ITEM OR SERVICE: HCPCS | Mod: GY | Performed by: PHYSICIAN ASSISTANT

## 2017-12-02 PROCEDURE — 36000063 ZZH SURGERY LEVEL 4 EA 15 ADDTL MIN: Performed by: ORTHOPAEDIC SURGERY

## 2017-12-02 PROCEDURE — 72131 CT LUMBAR SPINE W/O DYE: CPT

## 2017-12-02 PROCEDURE — 009T0ZZ DRAINAGE OF SPINAL MENINGES, OPEN APPROACH: ICD-10-PCS | Performed by: ORTHOPAEDIC SURGERY

## 2017-12-02 PROCEDURE — 25000125 ZZHC RX 250: Performed by: NURSE ANESTHETIST, CERTIFIED REGISTERED

## 2017-12-02 PROCEDURE — 87186 SC STD MICRODIL/AGAR DIL: CPT | Performed by: ORTHOPAEDIC SURGERY

## 2017-12-02 PROCEDURE — 25000566 ZZH SEVOFLURANE, EA 15 MIN: Performed by: ORTHOPAEDIC SURGERY

## 2017-12-02 PROCEDURE — 25000132 ZZH RX MED GY IP 250 OP 250 PS 637: Mod: GY | Performed by: PHYSICIAN ASSISTANT

## 2017-12-02 PROCEDURE — 25000128 H RX IP 250 OP 636: Performed by: ORTHOPAEDIC SURGERY

## 2017-12-02 PROCEDURE — 40000277 XR SURGERY CARM FLUORO LESS THAN 5 MIN W STILLS

## 2017-12-02 PROCEDURE — 25000128 H RX IP 250 OP 636: Performed by: NURSE ANESTHETIST, CERTIFIED REGISTERED

## 2017-12-02 PROCEDURE — 25000131 ZZH RX MED GY IP 250 OP 636 PS 637: Mod: GY | Performed by: INTERNAL MEDICINE

## 2017-12-02 PROCEDURE — 36000065 ZZH SURGERY LEVEL 4 W FLUORO 1ST 30 MIN: Performed by: ORTHOPAEDIC SURGERY

## 2017-12-02 PROCEDURE — 99232 SBSQ HOSP IP/OBS MODERATE 35: CPT | Performed by: INTERNAL MEDICINE

## 2017-12-02 PROCEDURE — 27210794 ZZH OR GENERAL SUPPLY STERILE: Performed by: ORTHOPAEDIC SURGERY

## 2017-12-02 PROCEDURE — 87070 CULTURE OTHR SPECIMN AEROBIC: CPT | Performed by: ORTHOPAEDIC SURGERY

## 2017-12-02 PROCEDURE — 85025 COMPLETE CBC W/AUTO DIFF WBC: CPT | Performed by: PHYSICIAN ASSISTANT

## 2017-12-02 PROCEDURE — 25000128 H RX IP 250 OP 636: Performed by: INTERNAL MEDICINE

## 2017-12-02 PROCEDURE — 12000007 ZZH R&B INTERMEDIATE

## 2017-12-02 PROCEDURE — 25000132 ZZH RX MED GY IP 250 OP 250 PS 637: Mod: GY | Performed by: ORTHOPAEDIC SURGERY

## 2017-12-02 PROCEDURE — 25800025 ZZH RX 258: Performed by: ORTHOPAEDIC SURGERY

## 2017-12-02 PROCEDURE — 00000146 ZZHCL STATISTIC GLUCOSE BY METER IP

## 2017-12-02 PROCEDURE — 25000125 ZZHC RX 250: Performed by: REGISTERED NURSE

## 2017-12-02 PROCEDURE — 87077 CULTURE AEROBIC IDENTIFY: CPT | Performed by: ORTHOPAEDIC SURGERY

## 2017-12-02 PROCEDURE — 25000128 H RX IP 250 OP 636: Performed by: REGISTERED NURSE

## 2017-12-02 PROCEDURE — 71000013 ZZH RECOVERY PHASE 1 LEVEL 1 EA ADDTL HR: Performed by: ORTHOPAEDIC SURGERY

## 2017-12-02 PROCEDURE — 93010 ELECTROCARDIOGRAM REPORT: CPT | Performed by: INTERNAL MEDICINE

## 2017-12-02 PROCEDURE — 40000169 ZZH STATISTIC PRE-PROCEDURE ASSESSMENT I: Performed by: ORTHOPAEDIC SURGERY

## 2017-12-02 PROCEDURE — 25000125 ZZHC RX 250: Performed by: ORTHOPAEDIC SURGERY

## 2017-12-02 RX ORDER — FENTANYL CITRATE 50 UG/ML
INJECTION, SOLUTION INTRAMUSCULAR; INTRAVENOUS PRN
Status: DISCONTINUED | OUTPATIENT
Start: 2017-12-02 | End: 2017-12-02

## 2017-12-02 RX ORDER — NALOXONE HYDROCHLORIDE 0.4 MG/ML
.1-.4 INJECTION, SOLUTION INTRAMUSCULAR; INTRAVENOUS; SUBCUTANEOUS
Status: DISCONTINUED | OUTPATIENT
Start: 2017-12-02 | End: 2017-12-07 | Stop reason: HOSPADM

## 2017-12-02 RX ORDER — DIPHENHYDRAMINE HYDROCHLORIDE 50 MG/ML
12.5 INJECTION INTRAMUSCULAR; INTRAVENOUS EVERY 6 HOURS PRN
Status: DISCONTINUED | OUTPATIENT
Start: 2017-12-02 | End: 2017-12-07 | Stop reason: HOSPADM

## 2017-12-02 RX ORDER — ONDANSETRON 2 MG/ML
4 INJECTION INTRAMUSCULAR; INTRAVENOUS EVERY 30 MIN PRN
Status: DISCONTINUED | OUTPATIENT
Start: 2017-12-02 | End: 2017-12-02

## 2017-12-02 RX ORDER — HYDROMORPHONE HYDROCHLORIDE 1 MG/ML
.3-.5 INJECTION, SOLUTION INTRAMUSCULAR; INTRAVENOUS; SUBCUTANEOUS EVERY 30 MIN PRN
Status: DISCONTINUED | OUTPATIENT
Start: 2017-12-02 | End: 2017-12-02

## 2017-12-02 RX ORDER — NEOSTIGMINE METHYLSULFATE 1 MG/ML
VIAL (ML) INJECTION PRN
Status: DISCONTINUED | OUTPATIENT
Start: 2017-12-02 | End: 2017-12-02

## 2017-12-02 RX ORDER — SODIUM CHLORIDE 9 MG/ML
INJECTION, SOLUTION INTRAVENOUS CONTINUOUS
Status: DISCONTINUED | OUTPATIENT
Start: 2017-12-02 | End: 2017-12-02

## 2017-12-02 RX ORDER — GINSENG 100 MG
CAPSULE ORAL PRN
Status: DISCONTINUED | OUTPATIENT
Start: 2017-12-02 | End: 2017-12-02 | Stop reason: HOSPADM

## 2017-12-02 RX ORDER — SODIUM CHLORIDE, SODIUM LACTATE, POTASSIUM CHLORIDE, CALCIUM CHLORIDE 600; 310; 30; 20 MG/100ML; MG/100ML; MG/100ML; MG/100ML
INJECTION, SOLUTION INTRAVENOUS CONTINUOUS
Status: DISCONTINUED | OUTPATIENT
Start: 2017-12-02 | End: 2017-12-02 | Stop reason: HOSPADM

## 2017-12-02 RX ORDER — CEFAZOLIN SODIUM 1 G/3ML
INJECTION, POWDER, FOR SOLUTION INTRAMUSCULAR; INTRAVENOUS PRN
Status: DISCONTINUED | OUTPATIENT
Start: 2017-12-02 | End: 2017-12-02

## 2017-12-02 RX ORDER — GLYCOPYRROLATE 0.2 MG/ML
INJECTION, SOLUTION INTRAMUSCULAR; INTRAVENOUS PRN
Status: DISCONTINUED | OUTPATIENT
Start: 2017-12-02 | End: 2017-12-02

## 2017-12-02 RX ORDER — LIDOCAINE 40 MG/G
CREAM TOPICAL
Status: DISCONTINUED | OUTPATIENT
Start: 2017-12-02 | End: 2017-12-06

## 2017-12-02 RX ORDER — SODIUM CHLORIDE 9 MG/ML
INJECTION, SOLUTION INTRAVENOUS CONTINUOUS
Status: DISCONTINUED | OUTPATIENT
Start: 2017-12-02 | End: 2017-12-07 | Stop reason: HOSPADM

## 2017-12-02 RX ORDER — ONDANSETRON 2 MG/ML
INJECTION INTRAMUSCULAR; INTRAVENOUS PRN
Status: DISCONTINUED | OUTPATIENT
Start: 2017-12-02 | End: 2017-12-02

## 2017-12-02 RX ORDER — CEFAZOLIN SODIUM 2 G/100ML
2 INJECTION, SOLUTION INTRAVENOUS
Status: COMPLETED | OUTPATIENT
Start: 2017-12-02 | End: 2017-12-02

## 2017-12-02 RX ORDER — HYDROMORPHONE HYDROCHLORIDE 1 MG/ML
.3-.5 INJECTION, SOLUTION INTRAMUSCULAR; INTRAVENOUS; SUBCUTANEOUS EVERY 5 MIN PRN
Status: DISCONTINUED | OUTPATIENT
Start: 2017-12-02 | End: 2017-12-02 | Stop reason: HOSPADM

## 2017-12-02 RX ORDER — ONDANSETRON 4 MG/1
4 TABLET, ORALLY DISINTEGRATING ORAL EVERY 30 MIN PRN
Status: DISCONTINUED | OUTPATIENT
Start: 2017-12-02 | End: 2017-12-07

## 2017-12-02 RX ORDER — FENTANYL CITRATE 0.05 MG/ML
25-50 INJECTION, SOLUTION INTRAMUSCULAR; INTRAVENOUS
Status: DISCONTINUED | OUTPATIENT
Start: 2017-12-02 | End: 2017-12-02 | Stop reason: HOSPADM

## 2017-12-02 RX ORDER — DIPHENHYDRAMINE HCL 12.5MG/5ML
12.5 LIQUID (ML) ORAL EVERY 6 HOURS PRN
Status: DISCONTINUED | OUTPATIENT
Start: 2017-12-02 | End: 2017-12-07 | Stop reason: HOSPADM

## 2017-12-02 RX ORDER — PROPOFOL 10 MG/ML
INJECTION, EMULSION INTRAVENOUS PRN
Status: DISCONTINUED | OUTPATIENT
Start: 2017-12-02 | End: 2017-12-02

## 2017-12-02 RX ORDER — FENTANYL CITRATE 50 UG/ML
50-100 INJECTION, SOLUTION INTRAMUSCULAR; INTRAVENOUS
Status: DISCONTINUED | OUTPATIENT
Start: 2017-12-02 | End: 2017-12-02 | Stop reason: HOSPADM

## 2017-12-02 RX ORDER — LIDOCAINE HYDROCHLORIDE 20 MG/ML
INJECTION, SOLUTION INFILTRATION; PERINEURAL PRN
Status: DISCONTINUED | OUTPATIENT
Start: 2017-12-02 | End: 2017-12-02

## 2017-12-02 RX ADMIN — SODIUM CHLORIDE: 9 INJECTION, SOLUTION INTRAVENOUS at 10:46

## 2017-12-02 RX ADMIN — CARVEDILOL 25 MG: 25 TABLET, FILM COATED ORAL at 21:24

## 2017-12-02 RX ADMIN — SODIUM CHLORIDE, POTASSIUM CHLORIDE, SODIUM LACTATE AND CALCIUM CHLORIDE: 600; 310; 30; 20 INJECTION, SOLUTION INTRAVENOUS at 14:28

## 2017-12-02 RX ADMIN — FUROSEMIDE 40 MG: 40 TABLET ORAL at 18:13

## 2017-12-02 RX ADMIN — ROCURONIUM BROMIDE 10 MG: 10 INJECTION INTRAVENOUS at 14:09

## 2017-12-02 RX ADMIN — HYDROMORPHONE HYDROCHLORIDE 0.2 MG: 1 INJECTION, SOLUTION INTRAMUSCULAR; INTRAVENOUS; SUBCUTANEOUS at 19:37

## 2017-12-02 RX ADMIN — OXYCODONE HYDROCHLORIDE 5 MG: 5 TABLET ORAL at 21:24

## 2017-12-02 RX ADMIN — SODIUM CHLORIDE: 9 INJECTION, SOLUTION INTRAVENOUS at 17:25

## 2017-12-02 RX ADMIN — CLONIDINE HYDROCHLORIDE 0.1 MG: 0.1 TABLET ORAL at 21:24

## 2017-12-02 RX ADMIN — MIDAZOLAM HYDROCHLORIDE 2 MG: 1 INJECTION, SOLUTION INTRAMUSCULAR; INTRAVENOUS at 12:30

## 2017-12-02 RX ADMIN — LIDOCAINE HYDROCHLORIDE 100 MG: 20 INJECTION, SOLUTION INFILTRATION; PERINEURAL at 12:35

## 2017-12-02 RX ADMIN — Medication 2 LOZENGE: at 19:37

## 2017-12-02 RX ADMIN — CEFAZOLIN SODIUM 2 G: 2 INJECTION, SOLUTION INTRAVENOUS at 12:54

## 2017-12-02 RX ADMIN — ROCURONIUM BROMIDE 50 MG: 10 INJECTION INTRAVENOUS at 12:35

## 2017-12-02 RX ADMIN — HYDROMORPHONE HYDROCHLORIDE 0.5 MG: 1 INJECTION, SOLUTION INTRAMUSCULAR; INTRAVENOUS; SUBCUTANEOUS at 14:38

## 2017-12-02 RX ADMIN — OXYCODONE HYDROCHLORIDE 5 MG: 5 TABLET ORAL at 23:46

## 2017-12-02 RX ADMIN — DEXMEDETOMIDINE HYDROCHLORIDE 8 MCG: 100 INJECTION, SOLUTION INTRAVENOUS at 14:59

## 2017-12-02 RX ADMIN — DEXMEDETOMIDINE HYDROCHLORIDE 8 MCG: 100 INJECTION, SOLUTION INTRAVENOUS at 15:02

## 2017-12-02 RX ADMIN — ONDANSETRON 4 MG: 2 INJECTION INTRAMUSCULAR; INTRAVENOUS at 14:52

## 2017-12-02 RX ADMIN — FLUOXETINE 20 MG: 20 CAPSULE ORAL at 18:13

## 2017-12-02 RX ADMIN — Medication 0.5 MG: at 15:48

## 2017-12-02 RX ADMIN — PHENYLEPHRINE HYDROCHLORIDE 100 MCG: 10 INJECTION INTRAVENOUS at 14:01

## 2017-12-02 RX ADMIN — SODIUM CHLORIDE, POTASSIUM CHLORIDE, SODIUM LACTATE AND CALCIUM CHLORIDE: 600; 310; 30; 20 INJECTION, SOLUTION INTRAVENOUS at 12:13

## 2017-12-02 RX ADMIN — PROCHLORPERAZINE EDISYLATE 5 MG: 5 INJECTION INTRAMUSCULAR; INTRAVENOUS at 09:22

## 2017-12-02 RX ADMIN — INSULIN GLARGINE 10 UNITS: 100 INJECTION, SOLUTION SUBCUTANEOUS at 10:34

## 2017-12-02 RX ADMIN — NEOSTIGMINE METHYLSULFATE 5 MG: 1 INJECTION INTRAMUSCULAR; INTRAVENOUS; SUBCUTANEOUS at 15:03

## 2017-12-02 RX ADMIN — VANCOMYCIN HYDROCHLORIDE 1500 MG: 5 INJECTION, POWDER, LYOPHILIZED, FOR SOLUTION INTRAVENOUS at 19:38

## 2017-12-02 RX ADMIN — ONDANSETRON 4 MG: 2 SOLUTION INTRAMUSCULAR; INTRAVENOUS at 04:34

## 2017-12-02 RX ADMIN — PROCHLORPERAZINE EDISYLATE 5 MG: 5 INJECTION INTRAMUSCULAR; INTRAVENOUS at 13:04

## 2017-12-02 RX ADMIN — Medication 0.5 MG: at 16:17

## 2017-12-02 RX ADMIN — PROPOFOL 200 MG: 10 INJECTION, EMULSION INTRAVENOUS at 12:35

## 2017-12-02 RX ADMIN — CARVEDILOL 25 MG: 25 TABLET, FILM COATED ORAL at 10:16

## 2017-12-02 RX ADMIN — Medication 0.5 MG: at 15:59

## 2017-12-02 RX ADMIN — HYDROMORPHONE HYDROCHLORIDE 0.2 MG: 1 INJECTION, SOLUTION INTRAMUSCULAR; INTRAVENOUS; SUBCUTANEOUS at 23:46

## 2017-12-02 RX ADMIN — CEFAZOLIN 1 G: 1 INJECTION, POWDER, FOR SOLUTION INTRAMUSCULAR; INTRAVENOUS at 14:50

## 2017-12-02 RX ADMIN — ONDANSETRON 4 MG: 2 SOLUTION INTRAMUSCULAR; INTRAVENOUS at 11:27

## 2017-12-02 RX ADMIN — GLYCOPYRROLATE 0.8 MG: 0.2 INJECTION, SOLUTION INTRAMUSCULAR; INTRAVENOUS at 15:03

## 2017-12-02 RX ADMIN — FENTANYL CITRATE 100 MCG: 50 INJECTION, SOLUTION INTRAMUSCULAR; INTRAVENOUS at 12:35

## 2017-12-02 RX ADMIN — FUROSEMIDE 40 MG: 40 TABLET ORAL at 18:12

## 2017-12-02 ASSESSMENT — VISUAL ACUITY
OU: NORMAL ACUITY

## 2017-12-02 ASSESSMENT — COPD QUESTIONNAIRES: COPD: 0

## 2017-12-02 ASSESSMENT — ACTIVITIES OF DAILY LIVING (ADL)
ADLS_ACUITY_SCORE: 9

## 2017-12-02 ASSESSMENT — LIFESTYLE VARIABLES: TOBACCO_USE: 0

## 2017-12-02 NOTE — PHARMACY-VANCOMYCIN DOSING SERVICE
Pharmacy Vancomycin Initial Note  Date of Service 2017  Patient's  1945  72 year old, female    Indication: Abscess    Current estimated CrCl = Estimated Creatinine Clearance: 36.3 mL/min (based on Cr of 1.86).    Creatinine for last 3 days  2017:  7:19 AM Creatinine 1.86 mg/dL    Recent Vancomycin Level(s) for last 3 days  No results found for requested labs within last 72 hours.      Vancomycin IV Administrations (past 72 hours)      No vancomycin orders with administrations in past 72 hours.                Nephrotoxins and other renal medications (Future)    Start     Dose/Rate Route Frequency Ordered Stop    17 1800  vancomycin (VANCOCIN) 1500 mg in 0.9% NaCl 250 mL PREMIX      1,500 mg  166.7 mL/hr over 90 Minutes Intravenous EVERY 24 HOURS 17 1737      17 0900  lisinopril (PRINIVIL/Zestril) tablet 40 mg      40 mg Oral DAILY 17 2324      17 0900  furosemide (LASIX) tablet 40 mg      40 mg Oral DAILY 17 2324            Contrast Orders - past 72 hours     None                Plan:  1.  Start vancomycin  1500 mg IV q24h.   2.  Goal Trough Level: 10-15 mg/L   3.  Pharmacy will check trough levels as appropriate in 1-3 Days.    4. Serum creatinine levels will be ordered daily for the first week of therapy and at least twice weekly for subsequent weeks.    5. [place] method utilized to dose vancomycin therapy: Method 1    Lauren Lockett

## 2017-12-02 NOTE — PHARMACY-ADMISSION MEDICATION HISTORY
Admission medication history interview status for the 12/1/2017 admission is complete. See Epic admission navigator for allergy information, pharmacy, prior to admission medications and immunization status.     Medication history interview sources:  Patient     Changes made to PTA medication list (reason)  Added: None  Deleted: Vitamin B12, Oxycodone, Nifedipine  Changed: clonidine, lantus    Additional medication history information (including reliability of information, actions taken by pharmacist):None      Prior to Admission medications    Medication Sig Last Dose Taking? Auth Provider   CLONIDINE HCL PO Take 0.2 mg by mouth At Bedtime Patient takes 0.1mg in the afternoon and 0.2mg at bedtime 11/30/2017 Yes Unknown, Entered By History   METFORMIN HCL PO Take 1,000 mg by mouth 2 times daily (with meals) takes 2 x 500mg tablets 11/30/2017 Yes Unknown, Entered By History   Acetaminophen (TYLENOL PO) Take 1,500 mg by mouth daily as needed for mild pain or fever Past Week at Unknown time Yes Reported, Patient   LORAZEPAM PO Take 1 mg by mouth 2 times daily as needed  Past Week at Unknown time Yes Reported, Patient   Omega-3 Fatty Acids (OMEGA-3 FISH OIL PO) Take 1 capsule by mouth twice a week 11/30/2017  Reported, Patient   FLUOXETINE HCL PO Take 20 mg by mouth daily 11/30/2017  Reported, Patient   FUROSEMIDE PO Take 40 mg by mouth daily (Takes 2 x 20mg tablet) 11/30/2017  Reported, Patient   BENAZEPRIL HCL PO Take 40 mg by mouth daily 11/30/2017  Reported, Patient   LEVOTHYROXINE SODIUM PO Take 175 mcg by mouth daily 11/30/2017  Reported, Patient   CLONIDINE HCL PO Take 0.1 mg by mouth daily Patient takes 0.1mg in the afternoon and 0.2mg at bedtime 11/30/2017  Reported, Patient   CARVEDILOL PO Take 25 mg by mouth 2 times daily In the Afternoon and at Bedtime 11/30/2017  Reported, Patient   PRAVASTATIN SODIUM PO Take 40 mg by mouth every evening 11/30/2017  Reported, Patient   insulin pen needle (ULTICARE SHORT PEN  NEEDLES) 31G X 8 MM MISC Use BID   Pia Peña NP   Glucose Blood (ACCU-CHEK SHANTEL) STRP Pt tests 4 times daily before meals and nightly   Pia Peña NP   LANTUS SOLOSTAR 100 UNIT/ML injection Inject 47 Units Subcutaneous every morning  11/30/2017  Pia Peña NP   Blood Pressure Monitor KIT 1 Device daily. Check BP daily   Pia Peña NP   aspirin 81 MG tablet Take 1 tablet by mouth every evening  11/30/2017 at pm  Cooper Almendarez MD         Medication history completed by: Iraida Ojeda, YesiD

## 2017-12-02 NOTE — PLAN OF CARE
Problem: Patient Care Overview  Goal: Plan of Care/Patient Progress Review  PT: Pt scheduled for surgery at noon. No PT eval today.

## 2017-12-02 NOTE — H&P
"History & Physical     Patient name: Mary Baker   : [birth year]  MRN: [id number]  DATE OF ADMISSION:  2017     HISTORY OF PRESENT ILLNESS:  This is a 72 year old female with PMHx of CKD III, insulin dependent T2DM, anxiety, HTN, hyperlipidemia, and hypothyroidism who presents as a transfer from [facility] in [place] for further evaluation and treatment of low back pain.     Pt notes that symptoms started  after extensive strenuous activity the preceeding days in preparation for hosting family for Thanksgiving. Described initial pain as a sensation of \"pulling a muscle\" which she localized to her left hip. She was seen by her chiropractor in the outpatient setting and notes that in the following days, her symptoms worsened with pain in both left and right hips, radiating to buttocks. Exacerbated with movement and weight-bearing; no worse pain on one side vs other. Due to ongoing symptoms and difficulty bearing weight, presented to the ED. Lumbar lamenectomy and fusion in 2016. Pt notes no issues with her back since that time, no post-operative complications or prolonged recovery time.     In the ED, basic lab work and imaging revealed a creatinine of 1.84 (unclear of baseline), WBC 14.9, Hgb 11.7. , CRP 23.94. UA unremarkable. MR showed a fluid collection at L3 through L4, with severe spinal canal stenosis, some potential impingement of the transversing cauda equina nerve roots.  In the ED, the patient was given Percocet and Dr. Jay who performed Marynoel Baker's (patient's) prior lumbar spine surgery in 2016 was contacted and recommended transfer to [facility].      On my interview, the patient is resting comfortably in bed.  She confirmed the above history.  She notes that at rest she is completely asymptomatic but when she starts to move, that is when she feels her pain.  She denies any numbness or tingling, no saddle anesthesia.  She did have a bout of bladder " retention, but urinated in the ED without significant post-voiding residual.  She does note some bladder incontinence that began after her symptoms started, but does not occur every day.  She has no overall generalized lower extremity weakness.      REVIEW OF SYSTEMS:  A 10-point review of systems was performed and is otherwise negative unless specified in the HPI.      PAST MEDICAL HISTORY:   1.  CKD stage III, unclear what baseline creatinine is, but from  it was around 1.38.   2.  Type 2 diabetes mellitus, insulin-dependent.   3.  Anxiety.   4.  Hypertension.   5.  Hyperlipidemia.   6.  Hypothyroidism.   7.  JONATHAN, utilizing CPAP.      PRIOR TO ADMISSION MEDICATIONS:    Prior to Admission Medications   Prescriptions Last Dose Informant Patient Reported? Taking?   Acetaminophen (TYLENOL PO)  Self Yes No   Sig: Take 1,500 mg by mouth daily as needed for mild pain or fever   BENAZEPRIL HCL PO  Self Yes No   Sig: Take 40 mg by mouth daily   Blood Pressure Monitor KIT  Self No No   Si Device daily. Check BP daily   CARVEDILOL PO  Self Yes No   Sig: Take 25 mg by mouth 2 times daily In the Afternoon and at Bedtime   CLONIDINE HCL PO  Self Yes No   Sig: Take 0.1 mg by mouth 2 times daily In the Afternoon and at Bedtime   Cyanocobalamin (VITAMIN B 12 PO)  Self Yes No   Sig: Take 1 tablet by mouth daily   FLUOXETINE HCL PO  Self Yes No   Sig: Take 20 mg by mouth daily   FUROSEMIDE PO  Self Yes No   Sig: Take 40 mg by mouth daily (Takes 2 x 20mg tablet)   Glucose Blood (ACCU-CHEK SHANTEL) STRP  Self No No   Sig: Pt tests 4 times daily before meals and nightly   LANTUS SOLOSTAR 100 UNIT/ML injection  Self Yes No   Sig: Inject 35-45 Units Subcutaneous 2 times daily    LEVOTHYROXINE SODIUM PO  Self Yes No   Sig: Take 175 mcg by mouth daily   LORAZEPAM PO  Self Yes No   Sig: Take 1 mg by mouth 2 times daily as needed    METFORMIN HCL PO  Self Yes No   Sig: Take 1,000 mg by mouth 2 times daily (takes 2 x 500mg tablet)    NIFEDIPINE PO  Self Yes No   Sig: Take 90 mg by mouth daily   Omega-3 Fatty Acids (OMEGA-3 FISH OIL PO)  Self Yes No   Sig: Take 1 capsule by mouth twice a week   PRAVASTATIN SODIUM PO  Self Yes No   Sig: Take 40 mg by mouth every evening   aspirin 81 MG tablet  Self Yes No   Sig: Take 1 tablet by mouth daily as needed    insulin pen needle (ULTICARE SHORT PEN NEEDLES) 31G X 8 MM MISC  Self No No   Sig: Use BID   oxyCODONE (ROXICODONE) 5 MG immediate release tablet   No No   Sig: Take 1-2 tablets (5-10 mg) by mouth every 4 hours as needed for moderate to severe pain      Facility-Administered Medications: None      ALLERGIES:  No known medication allergies.      PAST SURGICAL HISTORY:     1.  Lumbar surgery in 05/2016.     2.  Endometrial biopsy at 59 years old.     3.  Right middle finger trigger release in 2013.      SOCIAL HISTORY:  The patient lives at home with her  in [place], [place].  Denies tobacco or alcohol use.  No walker or cane for ambulatory assistance.      FAMILY HISTORY:  Reviewed and noncontributory to current chief complaint.      LABORATORY:  Reviewed per Epic.  Creatinine 1.84.  WBC 14.9.  Hemoglobin 11.7.  UA unremarkable.  .  CRP 23.94.      IMAGING:  MR as noted in the HPI.  CT abdomen and pelvis without acute pathology.      PHYSICAL EXAMINATION:   VITAL SIGNS:  T 98.1, P 79, /84, R 18, SpO2 96% on room air.       CONSTITUTIONAL: Pt laying in bed, dressed in hospital garb. Appears comfortable. Cooperative with interview. Accompanied by  at bedside.   HEENT: Normocephalic, atraumatic. Negative for conjunctival redness or scleral icterus.  Oral mucosa pink and moist; negative for ulcerations, erythema, or exudates.  Dentition in good repair.   CARDIOVASCULAR: RRR, no murmurs, rubs, or extra heart sounds appreciated. Pulses +2/4 and regular in upper and lower extremities, bilaterally.   RESPIRATORY: No increased work of breathing.  CTA, bilat; no wheezes, rales,  "or rhonchi appreciated.  GASTROINTESTINAL:  Abdomen soft, non-distended. BS auscultated in all four quadrants. Negative for tenderness to palpation.  No masses or organomegaly noted.  MUSCULOSKELETAL: Strength +5/5 in upper and lower extremities, bilaterally. Positive straight leg raise, although patient is able to raise leg about 45 degrees in air bilaterally. No gross deformities noted. Normal muscle tone.   HEMATOLOGIC/LYMPHATIC/IMMUNOLOGIC: Negative for lower extremity edema, bilaterally.  NEUROLOGIC: Alert and oriented to person, place, and time.  strength intact. Sensation equal and intact in lower extremities, bilaterally.   SKIN: Warm, dry, intact. No jaundice noted. Negative for suspicious lesions, rashes, bruising, open sores or abrasions.     ASSESSMENT AND PLAN:  Mary Baker is a 72-year-old female with past medical history of CKD stage III, insulin-dependent type 2 diabetes mellitus, anxiety, hypertension, hyperlipidemia, hypothyroidism who presents with complaints of increasing low back pain with imaging findings questioning a hematoma in her lumbar region.  Vitals currently within normal limits.  The patient is currently stable.       Acute lumbar radiculopathy:  This all started on 11/24 when patient states it felt as if she \"pulled a muscle\" around her left hip.  She was seen by a chiropractor.  Thereafter, notes increasing pain from left hip to right hip into her gluts.  MR lumbar spine showing large bilateral facet joint effusions with bone marrow edema within the left articulating facets and inflammatory changes in the left greater than right posterior paraspinal soft tissues with question of septic facet joint arthritis as well as a fluid collection at L3 through IV, with severe spinal canal stenosis and potential impingement of transversing cauda equina nerve roots.  The patient is without saddle anesthesia, generalized weakness, numbness or tingling in her lower extremities.  She did " complain of some bladder retention and then incontinence thereafter.  That has been intermittent since the onset of her symptoms.  She is able to perform the straight leg raise with some pain in her lumbar region.  Her strength is +5/5 in her lower extremities.  Sensation equal and intact in bilateral lower extremities. WBC 14.9. . CRP 23.94. Hgb 11.7.   -- Admit under inpatient status.     -- Spine Surgery consulted; discussed case with Dr. Jay who does not plan for emergent surgery at this time.   -- Blood cultures obtained.   -- CBC and BMP in a.m.   -- Pain control with Tylenol, oxycodone and IV Dilaudid.   -- NPO at midnight.   -- Neuro checks; nursing should inform Hospitalist and Dr. Jay if any neurologic changes throughout the evening/night  -- Bladder scan per protocol for retention    Preoperative assessment:  Patient's revised cardiac risk index score plus her estimated risks puts her at low risk of adverse outcome with noncardiac surgery given her lack of CAD, CHF, CVA.  She has no active chest pain, shortness of breath, dizziness or lightheadedness.  She has tolerated prior surgeries without postoperative complications.  No intolerance to anesthesia.  No history of DVT or PE.  No family history of DVT or PE.   -- Will obtain an EKG.  If EKG is without acute abnormality.  The patient precede to surgery without further medical optimization.     Hypertension:  Continue PTA antihypertensive once verified by pharmacy with parameters in place.     Type 2 diabetes mellitus, insulin-dependent:  PTA regimen consists of Lantus 47 unts q.a.m. and Metformin 1000 mg b.i.d. Pt missed AM dosing.   -- Continue PTA Lantus at a reduced dosing of 20 units in the AM.    -- Medium dose sliding scale insulin.    -- A1C in the AM   -- BS per protocol     CKD III: Creatinine 1.84 on admission. Unclear what pt's baseline is.   -- Obtain medical records from [facility] to establish baseline   -- Avoid  nephrotoxic agents     Hypothyroidism:  Continue PTA Synthroid once verified by pharmacy.     Anxiety:  Continue PTA Lorazepam p.r.n.     Obstructive sleep apnea:  Continue CPAP with home setting.     Deep venous thrombosis prophylaxis:  PCDs ordered.     CODE STATUS:  Full code.      The patient was seen and assessed with Dr. Bella who agrees with the plan as outlined above.         RONY BELLA MD       As dictated by VIRGIL WALLACE PA-C            D: 2017 20:21   T: 2017 22:04   MT:       Name:     CAMILLE JENKINS   MRN:      [id number]        Account:      [id number]   :      1945           Admitted:     [id number]      Document: [id number]

## 2017-12-02 NOTE — PROGRESS NOTES
"[facility]    Hospitalist Progress Note      Assessment & Plan   Mary Baker is a 72 year old female who was admitted on 12/1/2017.     72-year-old female with past medical history of CKD stage III, insulin-dependent type 2 diabetes mellitus, anxiety, hypertension, hyperlipidemia, hypothyroidism who presents with complaints of increasing low back pain with imaging findings questioning a hematoma in her lumbar region.  Vitals currently within normal limits.  The patient is currently stable.        Acute lumbar radiculopathy:  This all started on 11/24 when patient states it felt as if she \"pulled a muscle\" around her left hip.  MR lumbar spine showing findings raising question of septic facet joint arthritis as well as a fluid collection at L3-L4. The patient presented without saddle anesthesia, generalized weakness, numbness or tingling in her lower extremities.  She did complain of some bladder retention and then incontinence thereafter.     Admit WBC 14.9. . CRP 23.94. Hgb 11.7.   -- Admitted under inpatient status.     -- Spine Surgery consulted; discussed case with Dr. Jay this AM--He plans on taking patient to OR around noon today (12/2).  -- Blood cultures obtained/pending.   -- CBC and BMP this AM, WBC up to 18.0, creatinine 1.86  -- Pain control with Tylenol, oxycodone and IV Dilaudid.   -- has been NPO after midnight. IV NS ordered at 75 cc/hr  -- Bladder scan per protocol for retention      Preoperative assessment:  Patient's revised cardiac risk index score plus her estimated risks puts her at low risk of adverse outcome with noncardiac surgery given her lack of CAD, CHF, CVA.  She has no active chest pain, shortness of breath, dizziness or lightheadedness.  She has tolerated prior surgeries without postoperative complications.  No intolerance to anesthesia.  No history of DVT or PE.  No family history of DVT or PE.   -- EKG is without acute abnormality.    --protracted nausea " without obvious cause, aside from possible association with spine concerns/infection. No alternative cause identified by history, exam, labs/abdominal CT (without contrast) at [facility] on 12/1. Moderate amount of stool on CT--no bowel obstruction/free air. Will start IV fluids (NS at 75 cc/hr).    Okay to proceed with surgery without further medical workup. Discussed with Dr Jay.      Hypertension:  Resume PTA antihypertensive meds post-op if taking po.      Type 2 diabetes mellitus, insulin-dependent:  PTA regimen consists of Lantus 47 unts q.a.m. and Metformin 1000 mg b.i.d. Pt missed AM dosing.   -- Reduced dose of Lantus this AM to 10 units, hold Novolog this AM.    -- Medium dose sliding scale insulin.    -- A1C 6.7  -- BS per protocol      CKD III: Creatinine 1.84 on admission, not significantly changed this AM. Unclear what pt's baseline is.   -- Obtain medical records from [facility] to establish baseline   -- Avoid nephrotoxic agents. Renal function not reduced to extent that we should expect nausea from uremia.      Hypothyroidism:  Continue PTA Synthroid once verified by pharmacy.      Anxiety:  Continue PTA Lorazepam p.r.n.      Obstructive sleep apnea:  Continue CPAP with home setting.      Deep venous thrombosis prophylaxis:  PCDs ordered.      CODE STATUS:  Full code.     Luis E Pak MD    Interval History   Reviewed patient's recent history in detail.   Left low back pain for several days, worse with activity, which is what prompted her to seek medical attention on 12/1.    Patient has had nausea and recurrent vomiting ever since around [facility]. No contacts with similar symptoms. Hasn't eaten much, has kept down some dry cereal, not much other solid food. Has kept down liquids in small amounts. Has not had BM for several days, but attributed this to not eating much. No fever or chills. Denies abdominal pain. No dysuria/hematuria.     -Data reviewed today: I reviewed all new  labs and imaging results over the last 24 hours. I personally reviewed the EKG tracing showing NSR, possible left atrial abnormality, no ischemic changes, otherwise unremarkable.    Physical Exam   Temp: 98.2  F (36.8  C) Temp src: Oral BP: 155/82 Pulse: 92 Heart Rate: 80 Resp: 16 SpO2: 94 % O2 Device: None (Room air)    Vitals:    12/01/17 1846 12/02/17 0655   Weight: 104.4 kg (230 lb 2.6 oz) 104.9 kg (231 lb 4.2 oz)     Vital Signs with Ranges  Temp:  [98  F (36.7  C)-98.8  F (37.1  C)] 98.2  F (36.8  C)  Pulse:  [] 92  Heart Rate:  [80] 80  Resp:  [16-20] 16  BP: (136-170)/(76-91) 155/82  SpO2:  [92 %-96 %] 94 %  I/O last 3 completed shifts:  In: 500 [P.O.:500]  Out: 200 [Emesis/NG output:200]    Constitutional: No distress apparent  Respiratory: Lungs clear to auscultation  Cardiovascular: RRR, normal S1S2  GI: Abdomen soft, no localized tenderness  Skin/Integumen: Skin warm/dry  Back: Patient points to left low back region in localizing her pain.    Medications        insulin glargine  10 Units Subcutaneous QAM AC     insulin aspart  1-7 Units Subcutaneous TID AC     insulin aspart  1-5 Units Subcutaneous At Bedtime     influenza Vac Split High-Dose  0.5 mL Intramuscular Prior to discharge     lisinopril  40 mg Oral Daily     carvedilol (COREG) tablet 25 mg  25 mg Oral BID     FLUoxetine (PROzac) capsule 20 mg  20 mg Oral Daily     furosemide (LASIX) tablet 40 mg  40 mg Oral Daily     levothyroxine (SYNTHROID/LEVOTHROID) tablet 175 mcg  175 mcg Oral Daily     NIFEdipine ER osmotic  90 mg Oral Daily     cloNIDine (CATAPRES) tablet 0.1 mg  0.1 mg Oral BID       Data     Recent Labs  Lab 12/02/17  0719   WBC 18.0*   HGB 11.0*   MCV 86         POTASSIUM 3.9   CHLORIDE 100   CO2 29   BUN 20   CR 1.86*   ANIONGAP 7   CHAIM 8.9   *       Recent Results (from the past 24 hour(s))   CT Lumbar Spine w/o Contrast    Narrative    CT LUMBAR SPINE WITHOUT CONTRAST 12/2/2017 9:49 AM     HISTORY:  Patient with epidural abscess, assess fusion and loosening of  screws.    TECHNIQUE: Axial images were obtained through the lumbar spine without  contrast. Coronal and sagittal reconstructions were also acquired.  Radiation dose for this scan was reduced using automated exposure  control, adjustment of the mA and/or kV according to patient size, or  iterative reconstruction technique.    COMPARISON: Outside MR dated 12/1/2017.    FINDINGS: There has been previous posterior decompression fusion and  instrumentation from L4 to S1. Transpedicular screws and rods are seen  at L4, L5, and S1 bilaterally. There is some minimal lucency around  the L4 screws bilaterally. The L5 and S1 screws do not show any  lucencies. Posterior alignment is normal. Disc space narrowing is  present at L5-S1.    T12-L1: Normal.    L1-L2: Normal.    L2-L3: Minimal disc bulging and ligamentum flavum hypertrophy. No  significant stenosis.    L3-L4: The disc space is better evaluated on the MR on the day before  due to better soft tissue contrast. There is abnormal soft tissue  protruding and compressing the thecal sac posteriorly which is between  2 abnormal facet joints which both contain fluid. There has been  previous posterior decompression at this level in the midline. The  abnormal soft tissue is protruding from this region in towards the  canal causing moderate central canal stenosis. There is no foraminal  stenosis. The abnormal soft tissue/fluid collection is associated with  slightly larger more posterior fluid collection and some abnormal  signal intensity within the muscles on MR study. The left L4 pedicle  is abnormal signal intensity on MR but does not show any sclerosis or  any bony erosive changes on CT.    L4-L5: Previous posterior instrumentation and fusion. No definite  solid fusion is present but this is difficult to assess as there is  some metallic artifact. The most bony bridging is on the right joints.  There is moderate  bilateral neural foraminal stenosis but no central  canal stenosis.    L5-SI: There has been prior posterior instrumentation fusion. There  appears to be solid bony bridging along the right facet joints. There  is no central canal stenosis. There is mild bilateral degenerative  foraminal stenosis.    Paraspinal soft tissues: Vascular calcifications noted in the aorta.      Impression    IMPRESSION:  1. Prior posterior decompressive procedure at L3-L4 with abnormal  fluid and/or soft tissue protruding posteriorly into the canal causing  moderate central canal stenosis. There is quite a bit of paraspinal  edematous changes in the adjacent muscle suggesting possibility this  could represent an infective abscess. There is no definite evidence  for osteomyelitis. While there is abnormal signal intensity in the  left L4 pedicle on MR this could just be due to edema or bony reaction  as there may be some loosening of both the left and right L4 screws.  2. Prior posterior instrumentation fusion from L4 to S1 the fusion  appears solid on the right at L5-S1 but there is no definite bony  bridging at L4-L5 but this is difficult to assess.   XR Lumbar Spine 2/3 Views    Narrative    XR LUMBAR SPINE 2-3 VIEWS 12/2/2017 9:59 AM    HISTORY: preop xrays;       Impression    IMPRESSION: Postoperative changes lower lumbar spine. Exam otherwise  negative.    LINDSAY PLATA MD

## 2017-12-02 NOTE — ANESTHESIA POSTPROCEDURE EVALUATION
Patient: Mary Dietzel    Procedure(s):  EVACUATION OF EPIDURAL ABCESS AND DECOMPRESSION L3-4 - Wound Class: I-Clean   - Wound Class: II-Clean Contaminated    Diagnosis:EPIDURAL ABCESS  Diagnosis Additional Information: No value filed.    Anesthesia Type:  General, ETT    Note:  Anesthesia Post Evaluation    Patient location during evaluation: bedside  Patient participation: Able to fully participate in evaluation  Level of consciousness: awake  Pain management: adequate  Airway patency: patent  Cardiovascular status: acceptable  Respiratory status: acceptable  Hydration status: acceptable  PONV: none     Anesthetic complications: None    Comments: No anesthetic complications noted.         Last vitals:  Vitals:    12/02/17 1535 12/02/17 1540 12/02/17 1550   BP:  159/80 159/80   Pulse:      Resp: (!) 31 22 12   Temp:   36.6  C (97.9  F)   SpO2:  97% 98%         Electronically Signed By: Rian Justice DO, DO  December 2, 2017  4:02 PM

## 2017-12-02 NOTE — CONSULTS
[facility]    Spine Surgery Consultation    Date of Admission:  12/1/2017  Date of Consult (When I saw the patient): 12/02/17    Assessment:  Epidural abscess L3-4 possible septic facet joint with severe spinal stenosis and bilateral leg radiculopathy    Plan:  To operating room today for evacuation of epidural abscess and decompression of spinal canal  I reviewed the MRI scan with the patient, her  and her son.  We discussed the risks of surgery including bleeding, ongoing infection, possible need for hardware removal, risk of injury to the nerves and spinal canal.  And other perioperative and operative risks.  After discussion they gave their informed consent.      Vishal Jay MD    Code Status    Full Code    Reason for Consult   Reason for consult:   Previous patient of mine transferred from [facility]    Primary Care Physician   Vishal Gracia    Chief Complaint   Low back pain and bilateral gluteal and posterior thigh pain    History of Present Illness   Mary Baker is a 72 year old female.  She underwent a decompression and posterior lumbar fusion in May 2016.  She did well after that surgery.  He was followed for a year had no complaints of back pain or leg pain.    The day after Thanksgiving of this year on 1124 she developed a pain or pulling sensation in her left gluteal and hip area.  She thought it was a pulled muscle.  She had some chiropractic treatment as an outpatient and notes that in the following days her symptoms worsened with pain in both left and right hips.  Pain was worse with weightbearing and movement.  She also started having nausea and vomiting.  The nausea and vomiting wound, and very quickly.  She denied any other systemic symptoms.    Because of increasing pain she presented to the [facility] emergency department yesterday.  Evaluation there showed that her white blood count was 14.9, hemoglobin 11.7, ESR was 113, CRP 23.94.  She had an  unremarkable UA.  An MRI scan was ordered which showed a large fluid collection at the L3-4 level causing impingement of the spinal canal with severe stenosis.  She was transferred to [facility].    She denies any fevers chills or sweats.  She denies any numbness or tingling.  No specific weakness.  She does have pain when she tries to sit up or stand or even turn in bed and radiates to her buttocks and into the posterior thighs.  She's been able to pass her urine, is not had any bowel incontinence.    She has not had any recent infections, she is a diabetic, has a history of CKD III, insulin-dependent type 2 diabetes, hypertension, anxiety, hyperlipidemia, hypothyroidism.  She denies any recent diarrhea.    Past Medical History   I have reviewed this patient's medical history and updated it with pertinent information if needed.   Past Medical History:   Diagnosis Date     Abnormal kidney function study      Chronic anxiety      Corneal abrasion 9/14/2015     Depressive disorder, not elsewhere classified      Edema      Essential hypertension, benign      Female hirsutism      Gluten-sensitive enteropathy      Obesity, unspecified      Other proteinuria      Pure hypercholesterolemia      Sciatica      Sleep apnea     uses c-pap     Spinal stenosis, lumbar region, without neurogenic claudication      Type II or unspecified type diabetes mellitus without mention of complication, not stated as uncontrolled      Unspecified hypothyroidism        Past Surgical History   I have reviewed this patient's surgical history and updated it with pertinent information if needed.  Past Surgical History:   Procedure Laterality Date     LAMINECTOMY, FUSION LUMBAR TWO LEVELS, COMBINED N/A 5/9/2016    Procedure: COMBINED LAMINECTOMY, FUSION LUMBAR TWO LEVELS;  Surgeon: Vishal Jay MD;  Location:  OR     SURGICAL HISTORY OF    [id number]    Endometrial Bx     SURGICAL HISTORY OF -   11/03    Right middle finger  trigger release       Prior to Admission Medications   Prior to Admission Medications   Prescriptions Last Dose Informant Patient Reported? Taking?   Acetaminophen (TYLENOL PO) Past Week at Unknown time Self Yes Yes   Sig: Take 1,500 mg by mouth daily as needed for mild pain or fever   BENAZEPRIL HCL PO 2017 Self Yes No   Sig: Take 40 mg by mouth daily   Blood Pressure Monitor KIT  Self No No   Si Device daily. Check BP daily   CARVEDILOL PO 2017 Self Yes No   Sig: Take 25 mg by mouth 2 times daily In the Afternoon and at Bedtime   CLONIDINE HCL PO 2017 Self Yes No   Sig: Take 0.1 mg by mouth daily Patient takes 0.1mg in the afternoon and 0.2mg at bedtime   CLONIDINE HCL PO 2017  Yes Yes   Sig: Take 0.2 mg by mouth At Bedtime Patient takes 0.1mg in the afternoon and 0.2mg at bedtime   FLUOXETINE HCL PO 2017 Self Yes No   Sig: Take 20 mg by mouth daily   FUROSEMIDE PO 2017 Self Yes No   Sig: Take 40 mg by mouth daily (Takes 2 x 20mg tablet)   Glucose Blood (ACCU-CHEK SHANTEL) STRP  Self No No   Sig: Pt tests 4 times daily before meals and nightly   LANTUS SOLOSTAR 100 UNIT/ML injection 2017 Self Yes No   Sig: Inject 47 Units Subcutaneous every morning    LEVOTHYROXINE SODIUM PO 2017 Self Yes No   Sig: Take 175 mcg by mouth daily   LORAZEPAM PO Past Week at Unknown time Self Yes Yes   Sig: Take 1 mg by mouth 2 times daily as needed    METFORMIN HCL PO 2017  Yes Yes   Sig: Take 1,000 mg by mouth 2 times daily (with meals) takes 2 x 500mg tablets   Omega-3 Fatty Acids (OMEGA-3 FISH OIL PO) 2017 Self Yes No   Sig: Take 1 capsule by mouth twice a week   PRAVASTATIN SODIUM PO 2017 Self Yes No   Sig: Take 40 mg by mouth every evening   aspirin 81 MG tablet 2017 at pm Self Yes No   Sig: Take 1 tablet by mouth every evening    insulin pen needle ([employer] SHORT PEN NEEDLES) 31G X 8 MM MISC  Self No No   Sig: Use BID      Facility-Administered  Medications: None     Allergies   Allergies   Allergen Reactions     Pravastatin Sodium Cramps     Effexor [Venlafaxine Hydrochloride]      Procardia [Nifedipine]        Social History   I have reviewed this patient's social history and updated it with pertinent information if needed. Mary Baker  reports that she has never smoked. She has never used smokeless tobacco. She reports that she does not drink alcohol or use illicit drugs.    Family History   I have reviewed this patient's family history and updated it with pertinent information if needed.   Family History   Problem Relation Age of Onset     CANCER Father      bone     HEART DISEASE Brother      Family History Negative Mother        Review of Systems   The 10 point Review of Systems is negative other than noted in the HPI or here.     Physical Exam   Temp: 98.2  F (36.8  C) Temp src: Oral BP: 170/87 Pulse: 92   Resp: 16 SpO2: 94 % O2 Device: None (Room air)    Vital Signs with Ranges  Temp:  [98  F (36.7  C)-98.8  F (37.1  C)] 98.2  F (36.8  C)  Pulse:  [] 92  Resp:  [16-20] 16  BP: (136-170)/(76-91) 170/87  SpO2:  [92 %-96 %] 94 %  231 lbs 4.2 oz Body mass index is 34.15 kg/(m^2).  Data Unavailable    Constitutional: Alert , Cooperative,   She is lying comfortably in bed.  She has no pain if she doesn't move.  If she tries to shift her weight a little bit shorter gets some back pain and buttock pain   Skin: There is no redness or erythema or swelling over her posterior incision which is well-healed.  Motor:      R / L  HF  5 / 5  Quad  5/ 5  Ant Tib  5/ 5  EHL  5/ 55  G/S  5/ 5    Sensation: Normal light touch L1 through S1 dermatomes denies any saddle anesthesia or numbness  Reflexes: Absent at the knees and ankles  Circulation: Normal circulation bilateral lower extremities  Special Tests: Straight leg raising a bit up to 45 degrees with some low back pain at that level.      Data   Results for orders placed or performed during the hospital  encounter of 12/01/17 (from the past 24 hour(s))   Glucose by meter   Result Value Ref Range    Glucose 184 (H) 70 - 99 mg/dL   Blood culture   Result Value Ref Range    Specimen Description Blood Right Hand     Culture Micro No growth after 8 hours    Blood culture   Result Value Ref Range    Specimen Description Blood Left Arm     Culture Micro No growth after 8 hours    Glucose by meter   Result Value Ref Range    Glucose 209 (H) 70 - 99 mg/dL   Glucose by meter   Result Value Ref Range    Glucose 178 (H) 70 - 99 mg/dL   Basic metabolic panel   Result Value Ref Range    Sodium 136 133 - 144 mmol/L    Potassium 3.9 3.4 - 5.3 mmol/L    Chloride 100 94 - 109 mmol/L    Carbon Dioxide 29 20 - 32 mmol/L    Anion Gap 7 3 - 14 mmol/L    Glucose 244 (H) 70 - 99 mg/dL    Urea Nitrogen 20 7 - 30 mg/dL    Creatinine 1.86 (H) 0.52 - 1.04 mg/dL    GFR Estimate 27 (L) >60 mL/min/1.7m2    GFR Estimate If Black 32 (L) >60 mL/min/1.7m2    Calcium 8.9 8.5 - 10.1 mg/dL   CBC with platelets differential   Result Value Ref Range    WBC 18.0 (H) 4.0 - 11.0 10e9/L    RBC Count 3.75 (L) 3.8 - 5.2 10e12/L    Hemoglobin 11.0 (L) 11.7 - 15.7 g/dL    Hematocrit 32.3 (L) 35.0 - 47.0 %    MCV 86 78 - 100 fl    MCH 29.3 26.5 - 33.0 pg    MCHC 34.1 31.5 - 36.5 g/dL    RDW 12.9 10.0 - 15.0 %    Platelet Count 342 150 - 450 10e9/L    Diff Method Automated Method     % Neutrophils 85.4 %    % Lymphocytes 4.3 %    % Monocytes 9.9 %    % Eosinophils 0.1 %    % Basophils 0.1 %    % Immature Granulocytes 0.2 %    Nucleated RBCs 0 0 /100    Absolute Neutrophil 15.4 (H) 1.6 - 8.3 10e9/L    Absolute Lymphocytes 0.8 0.8 - 5.3 10e9/L    Absolute Monocytes 1.8 (H) 0.0 - 1.3 10e9/L    Absolute Eosinophils 0.0 0.0 - 0.7 10e9/L    Absolute Basophils 0.0 0.0 - 0.2 10e9/L    Abs Immature Granulocytes 0.0 0 - 0.4 10e9/L    Absolute Nucleated RBC 0.0    Hemoglobin A1c   Result Value Ref Range    Hemoglobin A1C 6.7 (H) 4.3 - 6.0 %   EKG 12-lead, tracing only    Result Value Ref Range    Interpretation ECG Click View Image link to view waveform and result    Glucose by meter   Result Value Ref Range    Glucose 222 (H) 70 - 99 mg/dL       Imaging: My review:  I reviewed her MRI scan from yesterday.  It's in the system at [facility] I reviewed it with the radiologist shows severe spinal stenosis at the L3-4 level.  She has effusions in the L3-4 facets.  There is severe compression of the cauda equina.  We cannot tell if it's any osteomyelitis.  There is no evidence for discitis.

## 2017-12-02 NOTE — PROVIDER NOTIFICATION
"Dr. Pak paged, \"Patient with probable surgery this AM with Dr. Jay. Please advise on insulin this AM. Thanks!\"    Return call from Dr. Pak to 1/2 AM Lantus from 20 units to 10 units and HOLD AM Novolog. TORB.  "

## 2017-12-02 NOTE — PLAN OF CARE
Problem: Patient Care Overview  Goal: Plan of Care/Patient Progress Review  Outcome: Improving  A&Ox4. VSS on RA. Up with 1 assist. PVRs under parameters overnight. C/o nausea, zofran given x1 with good relief. IV SL. Denies pain at rest, pain increases with activity. Baseline tingling in BLE toes. NPO since midnight. Dr. Jay to see this morning and discuss plan of care.

## 2017-12-02 NOTE — ANESTHESIA POSTPROCEDURE EVALUATION
Patient: Mary Dietzel    Procedure(s):  EVACUATION OF EPIDURAL ABCESS AND DECOMPRESSION L3-4 - Wound Class: I-Clean   - Wound Class: II-Clean Contaminated    Diagnosis:EPIDURAL ABCESS  Diagnosis Additional Information: No value filed.    Anesthesia Type:  General, ETT    Note:  Anesthesia Post Evaluation    Patient location during evaluation: PACU  Patient participation: Able to fully participate in evaluation  Level of consciousness: awake and alert  Pain management: adequate  Airway patency: patent  Cardiovascular status: acceptable  Respiratory status: acceptable  Hydration status: acceptable  PONV: none and controlled     Anesthetic complications: None          Last vitals:  Vitals:    12/02/17 1625 12/02/17 1630 12/02/17 1710   BP:  148/76 131/68   Pulse:      Resp:  15 13   Temp:  36.8  C (98.2  F) 36.8  C (98.2  F)   SpO2: 96%  95%         Electronically Signed By: Adriano Patel MD  December 2, 2017  5:45 PM

## 2017-12-02 NOTE — ANESTHESIA CARE TRANSFER NOTE
Patient: Mary Dietzel    Procedure(s):  EVACUATION OF EPIDURAL ABCESS AND DECOMPRESSION - Wound Class: I-Clean    Diagnosis: EPIDURAL ABCESS  Diagnosis Additional Information: No value filed.    Anesthesia Type:   General, ETT     Note:  Airway :Face Mask  Patient transferred to:PACU  Comments: Transferred to PACU, spontaneous respirations, 10L oxygen via facemask.  All monitors and alarms on and functioning, VSS.  Patient awake, comfortable.  Report to PACU RN.Handoff Report: Identifed the Patient, Identified the Reponsible Provider, Reviewed the pertinent medical history, Discussed the surgical course, Reviewed Intra-OP anesthesia mangement and issues during anesthesia, Set expectations for post-procedure period and Allowed opportunity for questions and acknowledgement of understanding      Vitals: (Last set prior to Anesthesia Care Transfer)    CRNA VITALS  12/2/2017 1445 - 12/2/2017 1528      12/2/2017             Pulse: 77    SpO2: 98 %    Resp Rate (set): 10                Electronically Signed By: ANETTE Keen CRNA  December 2, 2017  3:28 PM

## 2017-12-02 NOTE — PLAN OF CARE
Problem: Pain, Acute (Adult)  Goal: Identify Related Risk Factors and Signs and Symptoms  Related risk factors and signs and symptoms are identified upon initiation of Human Response Clinical Practice Guideline (CPG).  Pt direct admission from [facility] [place]. Reports Hx of back pain radiating into buttock and bilateral legs that has been getting progressively worse over the last week. Pain worse with activity. Also reports that she has been having emesis 1-2x day since 11/23. Alert and oriented x4. VSS. CMS intact with 5/5 strength in BLEs. Lung sounds clear. +BS, last BM 11/29. Reports that she has difficulty emptying her bladder completely. Ambulating with SBA. Received percocet at [place]. Plan to see hospitalist and then Dr. Jay tomorrow.

## 2017-12-02 NOTE — ANESTHESIA PREPROCEDURE EVALUATION
Anesthesia Evaluation     . Pt has had prior anesthetic. Type: General    No history of anesthetic complications          ROS/MED HX    ENT/Pulmonary:     (+)sleep apnea, , . .   (-) tobacco use, asthma and COPD   Neurologic:       Cardiovascular:     (+) Dyslipidemia, hypertension----. : . . . :. .      (-) CAD   METS/Exercise Tolerance:     Hematologic:         Musculoskeletal:         GI/Hepatic:        (-) GERD   Renal/Genitourinary:     (+) chronic renal disease, type: CRI,       Endo:     (+) type II DM thyroid problem hypothyroidism, .      Psychiatric:     (+) psychiatric history depression      Infectious Disease:         Malignancy:         Other:                     Physical Exam  Normal systems: cardiovascular, pulmonary and dental    Airway   Mallampati: III  TM distance: >3 FB  Neck ROM: full    Dental     Cardiovascular       Pulmonary                     Anesthesia Plan      History & Physical Review  History and physical reviewed and following examination; no interval change.    ASA Status:  3 .    NPO Status:  > 8 hours    Plan for General and ETT with Intravenous induction. Maintenance will be Inhalation.    PONV prophylaxis:  Ondansetron (or other 5HT-3) and Promethazine or metoclopramide  Additional equipment: Videolaryngoscope Blood glucose checks      Postoperative Care  Postoperative pain management:  IV analgesics.      Consents  Anesthetic plan, risks, benefits and alternatives discussed with:  Patient..                          .

## 2017-12-02 NOTE — PLAN OF CARE
Problem: Patient Care Overview  Goal: Plan of Care/Patient Progress Review  OT: pt down in OR, no OT eval today. Will reschedule for tomorrow as appropriate

## 2017-12-02 NOTE — PLAN OF CARE
Problem: Patient Care Overview  Goal: Plan of Care/Patient Progress Review  Outcome: Improving  A&Ox4. Neuros intact, baseline neuropathy in bilateral toes. VSS, RA. ModCHO diet. Up with SBA. Denies pain at rest. BLE pain when ambulating. Voiding adequately, PVR 69. Plan for Dr Jay to see tomorrow.

## 2017-12-03 ENCOUNTER — APPOINTMENT (OUTPATIENT)
Dept: MRI IMAGING | Facility: CLINIC | Age: 72
DRG: 029 | End: 2017-12-03
Attending: ORTHOPAEDIC SURGERY
Payer: MEDICARE

## 2017-12-03 ENCOUNTER — DOCUMENTATION ONLY (OUTPATIENT)
Dept: ORTHOPEDICS | Facility: CLINIC | Age: 72
End: 2017-12-03

## 2017-12-03 LAB
ANION GAP SERPL CALCULATED.3IONS-SCNC: 7 MMOL/L (ref 3–14)
BASOPHILS # BLD AUTO: 0 10E9/L (ref 0–0.2)
BASOPHILS NFR BLD AUTO: 0.2 %
BUN SERPL-MCNC: 25 MG/DL (ref 7–30)
CALCIUM SERPL-MCNC: 8.1 MG/DL (ref 8.5–10.1)
CHLORIDE SERPL-SCNC: 101 MMOL/L (ref 94–109)
CO2 SERPL-SCNC: 28 MMOL/L (ref 20–32)
CREAT SERPL-MCNC: 2.1 MG/DL (ref 0.52–1.04)
DIFFERENTIAL METHOD BLD: ABNORMAL
EOSINOPHIL # BLD AUTO: 0.1 10E9/L (ref 0–0.7)
EOSINOPHIL NFR BLD AUTO: 0.3 %
ERYTHROCYTE [DISTWIDTH] IN BLOOD BY AUTOMATED COUNT: 13.1 % (ref 10–15)
GFR SERPL CREATININE-BSD FRML MDRD: 23 ML/MIN/1.7M2
GLUCOSE BLDC GLUCOMTR-MCNC: 111 MG/DL (ref 70–99)
GLUCOSE BLDC GLUCOMTR-MCNC: 160 MG/DL (ref 70–99)
GLUCOSE BLDC GLUCOMTR-MCNC: 163 MG/DL (ref 70–99)
GLUCOSE BLDC GLUCOMTR-MCNC: 192 MG/DL (ref 70–99)
GLUCOSE BLDC GLUCOMTR-MCNC: 218 MG/DL (ref 70–99)
GLUCOSE SERPL-MCNC: 173 MG/DL (ref 70–99)
HCT VFR BLD AUTO: 27.6 % (ref 35–47)
HGB BLD-MCNC: 9.3 G/DL (ref 11.7–15.7)
IMM GRANULOCYTES # BLD: 0 10E9/L (ref 0–0.4)
IMM GRANULOCYTES NFR BLD: 0.2 %
LACTATE BLD-SCNC: 0.8 MMOL/L (ref 0.7–2)
LYMPHOCYTES # BLD AUTO: 1.4 10E9/L (ref 0.8–5.3)
LYMPHOCYTES NFR BLD AUTO: 9.9 %
MCH RBC QN AUTO: 29.3 PG (ref 26.5–33)
MCHC RBC AUTO-ENTMCNC: 33.7 G/DL (ref 31.5–36.5)
MCV RBC AUTO: 87 FL (ref 78–100)
MONOCYTES # BLD AUTO: 1.3 10E9/L (ref 0–1.3)
MONOCYTES NFR BLD AUTO: 8.9 %
NEUTROPHILS # BLD AUTO: 11.7 10E9/L (ref 1.6–8.3)
NEUTROPHILS NFR BLD AUTO: 80.5 %
NRBC # BLD AUTO: 0 10*3/UL
NRBC BLD AUTO-RTO: 0 /100
PLATELET # BLD AUTO: 324 10E9/L (ref 150–450)
POTASSIUM SERPL-SCNC: 3.9 MMOL/L (ref 3.4–5.3)
RBC # BLD AUTO: 3.17 10E12/L (ref 3.8–5.2)
SODIUM SERPL-SCNC: 136 MMOL/L (ref 133–144)
WBC # BLD AUTO: 14.5 10E9/L (ref 4–11)

## 2017-12-03 PROCEDURE — 83605 ASSAY OF LACTIC ACID: CPT | Performed by: INTERNAL MEDICINE

## 2017-12-03 PROCEDURE — 25000128 H RX IP 250 OP 636: Performed by: PHYSICIAN ASSISTANT

## 2017-12-03 PROCEDURE — 99232 SBSQ HOSP IP/OBS MODERATE 35: CPT | Performed by: INTERNAL MEDICINE

## 2017-12-03 PROCEDURE — 25000132 ZZH RX MED GY IP 250 OP 250 PS 637: Mod: GY | Performed by: PHYSICIAN ASSISTANT

## 2017-12-03 PROCEDURE — 00000146 ZZHCL STATISTIC GLUCOSE BY METER IP

## 2017-12-03 PROCEDURE — A9270 NON-COVERED ITEM OR SERVICE: HCPCS | Mod: GY | Performed by: PHYSICIAN ASSISTANT

## 2017-12-03 PROCEDURE — 12000000 ZZH R&B MED SURG/OB

## 2017-12-03 PROCEDURE — S0032 INJECTION, NAFCILLIN SODIUM: HCPCS | Performed by: INTERNAL MEDICINE

## 2017-12-03 PROCEDURE — 25000131 ZZH RX MED GY IP 250 OP 636 PS 637: Mod: GY | Performed by: INTERNAL MEDICINE

## 2017-12-03 PROCEDURE — L0625 LO FLEX L1-BELOW L5 PRE OTS: HCPCS

## 2017-12-03 PROCEDURE — 80048 BASIC METABOLIC PNL TOTAL CA: CPT | Performed by: PHYSICIAN ASSISTANT

## 2017-12-03 PROCEDURE — 25000125 ZZHC RX 250: Performed by: PHYSICIAN ASSISTANT

## 2017-12-03 PROCEDURE — 72148 MRI LUMBAR SPINE W/O DYE: CPT

## 2017-12-03 PROCEDURE — 85025 COMPLETE CBC W/AUTO DIFF WBC: CPT | Performed by: PHYSICIAN ASSISTANT

## 2017-12-03 PROCEDURE — 25000125 ZZHC RX 250: Performed by: INTERNAL MEDICINE

## 2017-12-03 PROCEDURE — 36415 COLL VENOUS BLD VENIPUNCTURE: CPT | Performed by: INTERNAL MEDICINE

## 2017-12-03 PROCEDURE — 36415 COLL VENOUS BLD VENIPUNCTURE: CPT | Performed by: PHYSICIAN ASSISTANT

## 2017-12-03 RX ORDER — NAFCILLIN SODIUM 2 G/8ML
2 INJECTION, POWDER, FOR SOLUTION INTRAMUSCULAR; INTRAVENOUS EVERY 4 HOURS
Status: DISCONTINUED | OUTPATIENT
Start: 2017-12-03 | End: 2017-12-07 | Stop reason: HOSPADM

## 2017-12-03 RX ADMIN — ONDANSETRON 4 MG: 4 TABLET, ORALLY DISINTEGRATING ORAL at 11:52

## 2017-12-03 RX ADMIN — CARVEDILOL 25 MG: 25 TABLET, FILM COATED ORAL at 20:27

## 2017-12-03 RX ADMIN — OXYCODONE HYDROCHLORIDE 5 MG: 5 TABLET ORAL at 11:48

## 2017-12-03 RX ADMIN — FLUOXETINE 20 MG: 20 CAPSULE ORAL at 08:29

## 2017-12-03 RX ADMIN — LEVOTHYROXINE SODIUM 175 MCG: 150 TABLET ORAL at 08:29

## 2017-12-03 RX ADMIN — FUROSEMIDE 40 MG: 40 TABLET ORAL at 08:29

## 2017-12-03 RX ADMIN — ACETAMINOPHEN 650 MG: 325 TABLET, FILM COATED ORAL at 20:28

## 2017-12-03 RX ADMIN — ONDANSETRON 4 MG: 2 SOLUTION INTRAMUSCULAR; INTRAVENOUS at 19:54

## 2017-12-03 RX ADMIN — INSULIN ASPART 1 UNITS: 100 INJECTION, SOLUTION INTRAVENOUS; SUBCUTANEOUS at 22:09

## 2017-12-03 RX ADMIN — SODIUM CHLORIDE: 9 INJECTION, SOLUTION INTRAVENOUS at 19:55

## 2017-12-03 RX ADMIN — NAFCILLIN 2 G: 2 POWDER, FOR SOLUTION INTRAMUSCULAR; INTRAVENOUS at 15:10

## 2017-12-03 RX ADMIN — NIFEDIPINE 90 MG: 90 TABLET, FILM COATED, EXTENDED RELEASE ORAL at 08:36

## 2017-12-03 RX ADMIN — NAFCILLIN 2 G: 2 POWDER, FOR SOLUTION INTRAMUSCULAR; INTRAVENOUS at 23:38

## 2017-12-03 RX ADMIN — OXYCODONE HYDROCHLORIDE 5 MG: 5 TABLET ORAL at 04:20

## 2017-12-03 RX ADMIN — SODIUM CHLORIDE: 9 INJECTION, SOLUTION INTRAVENOUS at 04:21

## 2017-12-03 RX ADMIN — CARVEDILOL 25 MG: 25 TABLET, FILM COATED ORAL at 08:30

## 2017-12-03 RX ADMIN — CLONIDINE HYDROCHLORIDE 0.1 MG: 0.1 TABLET ORAL at 20:28

## 2017-12-03 RX ADMIN — NAFCILLIN 2 G: 2 POWDER, FOR SOLUTION INTRAMUSCULAR; INTRAVENOUS at 19:55

## 2017-12-03 RX ADMIN — CLONIDINE HYDROCHLORIDE 0.1 MG: 0.1 TABLET ORAL at 08:29

## 2017-12-03 RX ADMIN — ACETAMINOPHEN 650 MG: 325 TABLET, FILM COATED ORAL at 08:32

## 2017-12-03 RX ADMIN — LISINOPRIL 40 MG: 40 TABLET ORAL at 08:29

## 2017-12-03 RX ADMIN — INSULIN GLARGINE 10 UNITS: 100 INJECTION, SOLUTION SUBCUTANEOUS at 08:29

## 2017-12-03 RX ADMIN — PROCHLORPERAZINE EDISYLATE 5 MG: 5 INJECTION INTRAMUSCULAR; INTRAVENOUS at 16:56

## 2017-12-03 RX ADMIN — OXYCODONE HYDROCHLORIDE 5 MG: 5 TABLET ORAL at 08:32

## 2017-12-03 RX ADMIN — PROCHLORPERAZINE EDISYLATE 5 MG: 5 INJECTION INTRAMUSCULAR; INTRAVENOUS at 23:47

## 2017-12-03 RX ADMIN — NAFCILLIN 2 G: 2 POWDER, FOR SOLUTION INTRAMUSCULAR; INTRAVENOUS at 11:24

## 2017-12-03 ASSESSMENT — ACTIVITIES OF DAILY LIVING (ADL)
ADLS_ACUITY_SCORE: 9

## 2017-12-03 ASSESSMENT — VISUAL ACUITY
OU: NORMAL ACUITY

## 2017-12-03 NOTE — PLAN OF CARE
Problem: Patient Care Overview  Goal: Plan of Care/Patient Progress Review  Outcome: Improving  Pt returned from PACU at 1700 s/p lumbar laminectomy and drainage of abscess. Alert and oriented x4. VSS. CMS intact. Lung sounds clear. BS hypoactive. Tolerating CLD. Minimal back pain, has so far declined analgesics. Holcomb patent. Hemovac with minimal drainage.

## 2017-12-03 NOTE — PLAN OF CARE
Problem: Laminectomy/Foraminotomy/Discectomy (Adult)  Goal: Signs and Symptoms of Listed Potential Problems Will be Absent, Minimized or Managed (Laminectomy/Foraminotomy/Discectomy)  Signs and symptoms of listed potential problems will be absent, minimized or managed by discharge/transition of care (reference Laminectomy/Foraminotomy/Discectomy (Adult) CPG).   Outcome: Improving  POD 1.  A&O x4. CMS intact. Bowel sounds hypoactive, no flatus. VSS, 2L O2 via capnography canula. Dressing CDI. Hemovac w/ 50cc serosang drainage. Not OOB this shift. C/o mild-mod pain, decreased with IV dilaudid x2, oxycodone x2. Plan PT/OT, nrsng cont to monitor.

## 2017-12-03 NOTE — OP NOTE
DATE OF SURGERY:  12/02/2017      PREOPERATIVE DIAGNOSIS:  Epidural abscess L3-4, status post previous L4-S1 fusion and right L3-4 synovial cyst excision.      POSTOPERATIVE DIAGNOSIS:  Epidural abscess L3-4, status post previous L4-S1 fusion and right L3-4 synovial cyst excision, apparent septic left L3-4 facet joint.      PROCEDURE:  Revision decompression, L3-4, with epidural abscess excision and removal, excision of bilateral synovial cysts L3-4, and revision laminotomy of L4.      SURGEON:  Vishal Jay MD      ASSISTANT:  Dejah Mesa PA-C      ANESTHESIA:  General.      OPERATIVE DESCRIPTION:  Mary Baker was brought to the operating room.  General anesthetic was administered.  A Holcomb catheter was placed.  She was positioned prone on the Trios table, carefully padded and positioned, and her back was prepped and draped in routine sterile fashion.  After the timeout, a midline skin incision was made, reutilizing her previous incision.  Electrocautery was used to develop the incision down to the fascia.  The fascia over the spinous processes was incised.  I could palpate the spinous process of L2 and L3.  The spinous process of L4 had been removed.  She had had a decompression at the L4-5 level and a right-sided decompression with excision of synovial cyst.  As I was entering the space between just below the spinous process of L3, we encountered some jl purulence.  It was sent for culture and Gram stain.  The Gram stain came back as Gram-positive cocci in clusters.      We then mobilized scar tissue from around the L3-4 space, identifying the lamina of L4 and the lamina of L3.  The facet joints were obviously arthritic.  Scar tissue was mobilized from around the edge of the facet joints.  The canal itself had a thick scar.  Phlegmon-type tissue was on top of the dura.  I had to do a laminotomy of L3 so I could get up to a more normal-appearing interspace and dural space.  The dura itself looked quite  inflamed.  It was difficult to tell where the abscess was.  It appeared that there was some purulence in the left L3-4 facet joint.  I ended up doing a medial trimming of the facet capsule.  I did not have to remove any of the joint.  There was a fair amount of oozing from the scar tissue and bone.  Bone wax was placed over the bleeding cancellous surfaces.  The wound was irrigated with 2 liters of saline, followed by 2 liters of antibiotic solution.  We made one final check.  I checked the MRI scan and the x-rays, and then the wound was closed over a drain.  The drain was placed under the fascia.  The fascia was closed with interrupted and running #1 Vicryl suture, 2-0 for the subq, and staples for the skin.  Dressings were applied, drapes removed.  She was transferred back to the hospital cart and taken to the recovery room in good condition.      ESTIMATED BLOOD LOSS:  200 cc.      COMPLICATIONS:  None.      DRAINS:  One Hemovac.      FINDINGS:  Gram-positive cocci, epidural abscess.         VISHAL PEREZ MD             D: 2017 15:20   T: 2017 02:01   MT: [id number]      Name:     CAMILLE JENKINS   MRN:      [id number]        Account:        [id number]   :      1945           Procedure Date: 2017      Document: [id number]       cc: Vishal Gracia MD

## 2017-12-03 NOTE — PROGRESS NOTES
"[facility]    Hospitalist Progress Note      Assessment & Plan   Mary Baker is a 72 year old female who was admitted on 12/1/2017.     72-year-old female with past medical history of CKD stage III, insulin-dependent type 2 diabetes mellitus, anxiety, hypertension, hyperlipidemia, hypothyroidism who presents with complaints of increasing low back pain with imaging findings questioning a hematoma in her lumbar region.  Vitals currently within normal limits.  The patient is currently stable.         Epidural abscess L3-4, s/p surgical repair 12/2/2017    Patient presented with symptoms akin to a \"pulled muscle\" around her left hip.    MR lumbar spine showing findings raising question of septic facet joint arthritis as well as a fluid collection at L3-L4. The patient presented without saddle anesthesia, generalized weakness, numbness or tingling in her lower extremities.  She did complain of some bladder retention and then incontinence thereafter.       -- Admitted under inpatient status.     -- Spine Surgery consulted--surgical repair 12/2/2017.  -- Blood cultures negative to date  -- Abscess culture: Staph Aureus, sensitivities pending  --Spine surgery/Infectious Disease following.   ID recommending Nafcillin, holding Vancomycin due to acute on chronic kidney disease.      Protracted nausea likely related to above. No alternative cause identified by history, exam, labs/abdominal CT (without contrast) at [facility] on 12/1.   --appetite improving post-op, tolerating oral intake. Advance diet as tolerated.     Hypertension:  Resume PTA antihypertensive meds post-op if taking po.      Type 2 diabetes mellitus, insulin-dependent:  PTA regimen consists of Lantus 47 unts q.a.m. and Metformin 1000 mg b.i.d. Pt missed AM dosing.   -- Reduced dose of Lantus this AM to 10 units, hold Novolog this AM.    -- Medium dose sliding scale insulin.    -- A1C 6.7  -- BS per protocol       CKD III: Creatinine 1.84 on " admission, not significantly changed this AM. Unclear what pt's baseline is.   -- Obtain medical records from [facility] to establish baseline   -- Avoid nephrotoxic agents. Renal function not reduced to extent that we should expect nausea from uremia.       Hypothyroidism:  Continue PTA Synthroid once verified by pharmacy.       Anxiety:  Continue PTA Lorazepam p.r.n.       Obstructive sleep apnea:  Continue CPAP with home setting.       Deep venous thrombosis prophylaxis:  PCDs ordered.       CODE STATUS:  Full code.     Luis E Pak MD    Interval History   Patient able to take in fruit and some soup, she remains cautious due to her recent prolonged nausea and vomiting.   Post-op pain noted, no other pain.   No significant cough. No chest pain, dyspnea, palpitations or dizziness.     -Data reviewed today: I reviewed all new labs and imaging results over the last 24 hours. I personally reviewed no images or EKG's today.    Physical Exam   Temp: 98.8  F (37.1  C) Temp src: Oral BP: 127/68   Heart Rate: 82 Resp: 23 SpO2: 94 % O2 Device: Nasal cannula Oxygen Delivery: 2 LPM  Vitals:    12/02/17 0655 12/02/17 1710 12/03/17 0605   Weight: 104.9 kg (231 lb 4.2 oz) 104.9 kg (231 lb 4.2 oz) 109 kg (240 lb 4.8 oz)     Vital Signs with Ranges  Temp:  [98  F (36.7  C)-100  F (37.8  C)] 98.8  F (37.1  C)  Heart Rate:  [69-86] 82  Resp:  [12-27] 23  BP: (127-152)/(64-76) 127/68  SpO2:  [90 %-98 %] 94 %  I/O last 3 completed shifts:  In: 600 [I.V.:600]  Out: 1350 [Urine:1100; Drains:50; Blood:200]    Constitutional: No distress  Respiratory: Lungs clear to auscultation  Cardiovascular: RRR, normal S1S2  GI: Abdomen soft  Skin/Integumen: Skin warm/dry  Other:      Medications     NaCl 75 mL/hr at 12/03/17 0421       nafcillin  2 g Intravenous Q4H     insulin glargine  10 Units Subcutaneous QAM AC     sodium chloride (PF)  3 mL Intracatheter Q8H     insulin aspart  1-7 Units Subcutaneous TID AC     insulin aspart   1-5 Units Subcutaneous At Bedtime     influenza Vac Split High-Dose  0.5 mL Intramuscular Prior to discharge     lisinopril  40 mg Oral Daily     carvedilol (COREG) tablet 25 mg  25 mg Oral BID     FLUoxetine (PROzac) capsule 20 mg  20 mg Oral Daily     furosemide (LASIX) tablet 40 mg  40 mg Oral Daily     levothyroxine (SYNTHROID/LEVOTHROID) tablet 175 mcg  175 mcg Oral Daily     NIFEdipine ER osmotic  90 mg Oral Daily     cloNIDine (CATAPRES) tablet 0.1 mg  0.1 mg Oral BID       Data     Recent Labs  Lab 12/03/17  0728 12/02/17  0719   WBC 14.5* 18.0*   HGB 9.3* 11.0*   MCV 87 86    342    136   POTASSIUM 3.9 3.9   CHLORIDE 101 100   CO2 28 29   BUN 25 20   CR 2.10* 1.86*   ANIONGAP 7 7   CHAIM 8.1* 8.9   * 244*       All cultures:    Recent Labs  Lab 12/02/17  1312 12/01/17 2025 12/01/17 2020   CULT Heavy growthStaphylococcus aureus*  Susceptibility testing in progress  Culture negative monitoring continues No growth after 2 days No growth after 2 days         No results found for this or any previous visit (from the past 24 hour(s)).      Results for LORENACAMILLE LONGORIA (MRN [id number]) as of 12/3/2017 16:16   Ref. Range 12/2/2017 22:22 12/3/2017 01:57 12/3/2017 05:54 12/3/2017 07:28 12/3/2017 11:44   Glucose Latest Ref Range: 70 - 99 mg/dL 146 (H) 160 (H) 163 (H) 173 (H) 111 (H)

## 2017-12-03 NOTE — PROGRESS NOTES
[facility]    Spine Surgery  Daily Post-Op Note    Assessment & Plan   Procedure(s):  DECOMPRESSION LUMBAR ONE LEVEL  COMBINED INCISION AND DRAINAGE TRUNK   -1 Day Post-Op      ASSESSMENT:  Good improvement in active and bilateral gluteal pain  Resolution of nausea and vomiting  Increased creatinine    PLAN:  Plan for MRI scan to confirm complete decompression and rule out any occult pockets of purulent abscess  Plan for irrigation and debridement again tomorrow possible primary closure  Appreciate infectious disease management of antibiotics  Advance diet today    Vishal Jay MD      Interval History   Doing better, pain controlled, resolution of bilateral lower extremity symptoms.  She has not been up yet.  Passing flatus.  Appetite is returning, no nausea or vomiting.  Denies any numbness or tingling.    Physical Exam   Temp: 99.1  F (37.3  C) Temp src: Oral BP: 142/74   Heart Rate: 82 Resp: 16 SpO2: 90 % O2 Device: Nasal cannula Oxygen Delivery: 2 LPM  Vitals:    12/02/17 0655 12/02/17 1710 12/03/17 0605   Weight: 104.9 kg (231 lb 4.2 oz) 104.9 kg (231 lb 4.2 oz) 109 kg (240 lb 4.8 oz)     Vital Signs with Ranges  Temp:  [97.8  F (36.6  C)-100  F (37.8  C)] 99.1  F (37.3  C)  Heart Rate:  [69-86] 82  Resp:  [11-31] 16  BP: (131-163)/(64-83) 142/74  SpO2:  [90 %-100 %] 90 %  I/O last 3 completed shifts:  In: 1600 [I.V.:1600]  Out: 900 [Urine:650; Drains:50; Blood:200]    Alert, Oriented  Abd: S,NT,ND  Wound / Dressing: Clean, dry, and intact  Motor: Normal  Sensory: Normal  Vascular: Normal no edema  Hemovac 50 mL    Medications     NaCl 75 mL/hr at 12/03/17 0421        nafcillin  2 g Intravenous Q4H     insulin glargine  10 Units Subcutaneous QAM AC     sodium chloride (PF)  3 mL Intracatheter Q8H     insulin aspart  1-7 Units Subcutaneous TID AC     insulin aspart  1-5 Units Subcutaneous At Bedtime     influenza Vac Split High-Dose  0.5 mL Intramuscular Prior to discharge     lisinopril   40 mg Oral Daily     carvedilol (COREG) tablet 25 mg  25 mg Oral BID     FLUoxetine (PROzac) capsule 20 mg  20 mg Oral Daily     furosemide (LASIX) tablet 40 mg  40 mg Oral Daily     levothyroxine (SYNTHROID/LEVOTHROID) tablet 175 mcg  175 mcg Oral Daily     NIFEdipine ER osmotic  90 mg Oral Daily     cloNIDine (CATAPRES) tablet 0.1 mg  0.1 mg Oral BID       Data     Recent Labs  Lab 12/03/17  0728 12/02/17  0719   HGB 9.3* 11.0*       Recent Results (from the past 24 hour(s))   XR Surgery SCOUT L/T 5 Min Fluoro w Stills    Narrative    XR SURGERY SCOUT FLUORO LESS THAN 5 MIN W STILLS 12/2/2017 3:35 PM     COMPARISON:    HISTORY: Lumbar Spine Hardware Removal, AAG, [id number], Fluoro Time:  0min, 2 Images, C-arm #4.;     NUMBER OF IMAGES ACQUIRED: 2    VIEWS: 2    FLUOROSCOPY TIME: 0      Impression    IMPRESSION: C-arm fluoroscopy was utilized there the procedure on the  lumbar spine.    CATHY NOLEN MD

## 2017-12-03 NOTE — CONSULTS
[facility]    Infectious Disease Consultation     Date of Admission:  12/1/2017  Date of Consult (When I saw the patient): 12/03/17    Assessment & Plan   Mary Baker is a 72 year old female who was admitted on 12/1/2017.     Impression:  1. 72 y.o female with history of Laminectomy and fusion in 2016.   2. Presenting this occasion with back pain with imaging consistent with epidural abscess. S/P Revision decompression, L3-4, with epidural abscess excision and removal, excision of bilateral synovial cysts L3-4, and revision laminotomy of L4.   3. CKD with creat now up to 2.01 on vancomycin.   4. GPC in clusters in the cultures from the abscesses, yet to be identified. Blood cultures are pending.     Recommendations:   I say we stop Vancomycin and wait for cultures, if this indeed is MRSA infection she has enough Vanco on board for now and will reasess as needed but with creat rising best if we hold.   Start Nafcillin.   Will follow up on the pending cultures.       Desi Perez MD    Reason for Consult   Reason for consult: I was asked by Dejah GONZALEZ to evaluate this patient for epidural abscesses.    Primary Care Physician   Vishal Gracia    Chief Complaint   Back pain.     History is obtained from the patient and medical records    History of Present Illness   Mary Baker is a 72 year old female  PMHx of CKD III, insulin dependent T2DM, anxiety, HTN, hyperlipidemia, and hypothyroidism who presents as a transfer from [facility] in [place] for further evaluation and treatment of low back pain.      MR showed a fluid collection at L3 through L4, with severe spinal canal stenosis, some potential impingement of the transversing cauda equina nerve roots.  In the ED, the patient was given Percocet and Dr. Jay who performed Mary Baker's (patient's) prior lumbar spine surgery in 05/2016 was contacted and recommended transfer to [facility].         Past Medical History   I have  reviewed this patient's medical history and updated it with pertinent information if needed.   Past Medical History:   Diagnosis Date     Abnormal kidney function study      Chronic anxiety      Corneal abrasion 2015     Depressive disorder, not elsewhere classified      Edema      Essential hypertension, benign      Female hirsutism      Gluten-sensitive enteropathy      Obesity, unspecified      Other proteinuria      Pure hypercholesterolemia      Sciatica      Sleep apnea     uses c-pap     Spinal stenosis, lumbar region, without neurogenic claudication      Type II or unspecified type diabetes mellitus without mention of complication, not stated as uncontrolled      Unspecified hypothyroidism        Past Surgical History   I have reviewed this patient's surgical history and updated it with pertinent information if needed.  Past Surgical History:   Procedure Laterality Date     LAMINECTOMY, FUSION LUMBAR TWO LEVELS, COMBINED N/A 2016    Procedure: COMBINED LAMINECTOMY, FUSION LUMBAR TWO LEVELS;  Surgeon: Vishal Jay MD;  Location:  OR     SURGICAL HISTORY OF    [id number]    Endometrial Bx     SURGICAL HISTORY OF -       Right middle finger trigger release       Prior to Admission Medications   Prior to Admission Medications   Prescriptions Last Dose Informant Patient Reported? Taking?   Acetaminophen (TYLENOL PO) Past Week at Unknown time Self Yes Yes   Sig: Take 1,500 mg by mouth daily as needed for mild pain or fever   BENAZEPRIL HCL PO 2017 Self Yes No   Sig: Take 40 mg by mouth daily   Blood Pressure Monitor KIT  Self No No   Si Device daily. Check BP daily   CARVEDILOL PO 2017 Self Yes No   Sig: Take 25 mg by mouth 2 times daily In the Afternoon and at Bedtime   CLONIDINE HCL PO 2017 Self Yes No   Sig: Take 0.1 mg by mouth daily Patient takes 0.1mg in the afternoon and 0.2mg at bedtime   CLONIDINE HCL PO 2017  Yes Yes   Sig: Take 0.2 mg by mouth At Bedtime  Patient takes 0.1mg in the afternoon and 0.2mg at bedtime   FLUOXETINE HCL PO 11/30/2017 Self Yes No   Sig: Take 20 mg by mouth daily   FUROSEMIDE PO 11/30/2017 Self Yes No   Sig: Take 40 mg by mouth daily (Takes 2 x 20mg tablet)   Glucose Blood (ACCU-CHEK SHANTEL) STRP  Self No No   Sig: Pt tests 4 times daily before meals and nightly   LANTUS SOLOSTAR 100 UNIT/ML injection 11/30/2017 Self Yes No   Sig: Inject 47 Units Subcutaneous every morning    LEVOTHYROXINE SODIUM PO 11/30/2017 Self Yes No   Sig: Take 175 mcg by mouth daily   LORAZEPAM PO Past Week at Unknown time Self Yes Yes   Sig: Take 1 mg by mouth 2 times daily as needed    METFORMIN HCL PO 11/30/2017  Yes Yes   Sig: Take 1,000 mg by mouth 2 times daily (with meals) takes 2 x 500mg tablets   Omega-3 Fatty Acids (OMEGA-3 FISH OIL PO) 11/30/2017 Self Yes No   Sig: Take 1 capsule by mouth twice a week   PRAVASTATIN SODIUM PO 11/30/2017 Self Yes No   Sig: Take 40 mg by mouth every evening   aspirin 81 MG tablet 11/30/2017 at pm Self Yes No   Sig: Take 1 tablet by mouth every evening    insulin pen needle ([employer] SHORT PEN NEEDLES) 31G X 8 MM MISC  Self No No   Sig: Use BID      Facility-Administered Medications: None     Allergies   Allergies   Allergen Reactions     Pravastatin Sodium Cramps     Effexor [Venlafaxine Hydrochloride]      Procardia [Nifedipine]        Immunization History   Immunization History   Administered Date(s) Administered     Influenza (High Dose) 3 valent vaccine 10/15/2012     Influenza (IIV3) PF 11/11/2003, 11/01/2004, 11/17/2005, 10/18/2006, 11/28/2007, 10/23/2008, 09/25/2009, 10/28/2010, 10/20/2011     Pneumococcal 23 valent 10/19/2001, 10/15/2012     Zoster vaccine, live 01/30/2013       Social History   I have reviewed this patient's social history and updated it with pertinent information if needed. Mary Baker  reports that she has never smoked. She has never used smokeless tobacco. She reports that she does not drink alcohol  or use illicit drugs.    Family History   I have reviewed this patient's family history and updated it with pertinent information if needed.   Family History   Problem Relation Age of Onset     Family History Negative Mother      CANCER Father      bone     HEART DISEASE Brother        Review of Systems   The 10 point Review of Systems is negative other than noted in the HPI or here.     Physical Exam   Temp: 99.1  F (37.3  C) Temp src: Oral BP: 142/74   Heart Rate: 82 Resp: 20 SpO2: 90 % O2 Device: Nasal cannula Oxygen Delivery: 2 LPM  Vital Signs with Ranges  Temp:  [97.8  F (36.6  C)-100  F (37.8  C)] 99.1  F (37.3  C)  Heart Rate:  [69-86] 82  Resp:  [11-31] 20  BP: (131-163)/(64-83) 142/74  SpO2:  [90 %-100 %] 90 %  240 lbs 4.82 oz    GENERAL APPEARANCE:  alert and no distress  EYES: Eyes grossly normal to inspection, PERRL and conjunctivae and sclerae normal  HENT: ear canals and TM's normal and nose and mouth without ulcers or lesions  NECK: no adenopathy, no asymmetry, masses, or scars and thyroid normal to palpation  RESP: lungs clear to auscultation - no rales, rhonchi or wheezes  CV: regular rates and rhythm, normal S1 S2, no S3 or S4 and no murmur, click or rub  LYMPHATICS: normal ant/post cervical and supraclavicular nodes  ABDOMEN: soft, nontender, without hepatosplenomegaly or masses and bowel sounds normal  MS: extremities normal- no gross deformities noted  SKIN: no suspicious lesions or rashes      Data   Lab Results   Component Value Date    WBC 14.5 (H) 12/03/2017    HGB 9.3 (L) 12/03/2017    HCT 27.6 (L) 12/03/2017     12/03/2017     12/03/2017    POTASSIUM 3.9 12/03/2017    CHLORIDE 101 12/03/2017    CO2 28 12/03/2017    BUN 25 12/03/2017    CR 2.10 (H) 12/03/2017     (H) 12/03/2017    SED 30 09/06/2012    NTBNPI 5230 (H) 05/12/2016    AST 77 (H) 04/29/2013    ALT 45 04/29/2013    ALKPHOS 96 04/29/2013    BILITOTAL 0.8 04/29/2013       Recent Labs  Lab 12/02/17  1856  12/01/17 2025 12/01/17 2020   CULT PENDING  PENDING No growth after 2 days No growth after 2 days     Recent Labs   Lab Test  12/02/17   1312  12/01/17 2025 12/01/17 2020 05/04/11   1326  04/25/11   0938  06/25/10   1016   CULT  PENDING  PENDING  No growth after 2 days  No growth after 2 days  <10,000 colonies/mL Staphylococcus aureus Plus 10 to 50,000 colonies/mL Multiple species present, probable perineal  contamination.  10 to 50,000 colonies/mL Multiple species present, probable perineal  contamination.  10 to 50,000 colonies/mL Multiple species present, probable perineal  contamination.

## 2017-12-03 NOTE — PLAN OF CARE
Problem: Patient Care Overview  Goal: Plan of Care/Patient Progress Review  OT: Pt awaiting back brace for OOB activity. Will hold OT eval and intervention at this time.

## 2017-12-03 NOTE — PLAN OF CARE
Problem: Patient Care Overview  Goal: Plan of Care/Patient Progress Review  PT: Orders received, chart reviewed. Evaluation attempted this AM, patient was waiting for LS corset. Attempted again this PM, noted plan to return to OR tomorrow for repeat I&D. Patient has been mobilizing to chair with RN staff. Appropriate to hold PT until post procedure on 12/4. Patient agrees with this plan as she just returned to bed.

## 2017-12-04 ENCOUNTER — ANESTHESIA EVENT (OUTPATIENT)
Dept: SURGERY | Facility: CLINIC | Age: 72
DRG: 029 | End: 2017-12-04
Payer: MEDICARE

## 2017-12-04 ENCOUNTER — ANESTHESIA (OUTPATIENT)
Dept: SURGERY | Facility: CLINIC | Age: 72
DRG: 029 | End: 2017-12-04
Payer: MEDICARE

## 2017-12-04 LAB
ANION GAP SERPL CALCULATED.3IONS-SCNC: 8 MMOL/L (ref 3–14)
BASOPHILS # BLD AUTO: 0 10E9/L (ref 0–0.2)
BASOPHILS NFR BLD AUTO: 0.1 %
BUN SERPL-MCNC: 29 MG/DL (ref 7–30)
CALCIUM SERPL-MCNC: 8.3 MG/DL (ref 8.5–10.1)
CHLORIDE SERPL-SCNC: 102 MMOL/L (ref 94–109)
CO2 SERPL-SCNC: 29 MMOL/L (ref 20–32)
CREAT SERPL-MCNC: 2.2 MG/DL (ref 0.52–1.04)
DIFFERENTIAL METHOD BLD: ABNORMAL
EOSINOPHIL # BLD AUTO: 0.2 10E9/L (ref 0–0.7)
EOSINOPHIL NFR BLD AUTO: 1.5 %
ERYTHROCYTE [DISTWIDTH] IN BLOOD BY AUTOMATED COUNT: 12.9 % (ref 10–15)
GFR SERPL CREATININE-BSD FRML MDRD: 22 ML/MIN/1.7M2
GLUCOSE BLDC GLUCOMTR-MCNC: 114 MG/DL (ref 70–99)
GLUCOSE BLDC GLUCOMTR-MCNC: 123 MG/DL (ref 70–99)
GLUCOSE BLDC GLUCOMTR-MCNC: 141 MG/DL (ref 70–99)
GLUCOSE BLDC GLUCOMTR-MCNC: 149 MG/DL (ref 70–99)
GLUCOSE BLDC GLUCOMTR-MCNC: 153 MG/DL (ref 70–99)
GLUCOSE BLDC GLUCOMTR-MCNC: 155 MG/DL (ref 70–99)
GLUCOSE SERPL-MCNC: 139 MG/DL (ref 70–99)
HCT VFR BLD AUTO: 28.2 % (ref 35–47)
HGB BLD-MCNC: 9.5 G/DL (ref 11.7–15.7)
IMM GRANULOCYTES # BLD: 0 10E9/L (ref 0–0.4)
IMM GRANULOCYTES NFR BLD: 0.2 %
LYMPHOCYTES # BLD AUTO: 1.5 10E9/L (ref 0.8–5.3)
LYMPHOCYTES NFR BLD AUTO: 15 %
MCH RBC QN AUTO: 29.1 PG (ref 26.5–33)
MCHC RBC AUTO-ENTMCNC: 33.7 G/DL (ref 31.5–36.5)
MCV RBC AUTO: 86 FL (ref 78–100)
MONOCYTES # BLD AUTO: 1 10E9/L (ref 0–1.3)
MONOCYTES NFR BLD AUTO: 9.8 %
NEUTROPHILS # BLD AUTO: 7.3 10E9/L (ref 1.6–8.3)
NEUTROPHILS NFR BLD AUTO: 73.4 %
NRBC # BLD AUTO: 0 10*3/UL
NRBC BLD AUTO-RTO: 0 /100
PLATELET # BLD AUTO: 304 10E9/L (ref 150–450)
POTASSIUM SERPL-SCNC: 3.5 MMOL/L (ref 3.4–5.3)
RBC # BLD AUTO: 3.27 10E12/L (ref 3.8–5.2)
SODIUM SERPL-SCNC: 139 MMOL/L (ref 133–144)
WBC # BLD AUTO: 9.9 10E9/L (ref 4–11)

## 2017-12-04 PROCEDURE — 00000146 ZZHCL STATISTIC GLUCOSE BY METER IP

## 2017-12-04 PROCEDURE — 80048 BASIC METABOLIC PNL TOTAL CA: CPT | Performed by: INTERNAL MEDICINE

## 2017-12-04 PROCEDURE — 25000132 ZZH RX MED GY IP 250 OP 250 PS 637: Mod: GY | Performed by: PHYSICIAN ASSISTANT

## 2017-12-04 PROCEDURE — 25000128 H RX IP 250 OP 636: Performed by: PHYSICIAN ASSISTANT

## 2017-12-04 PROCEDURE — 25000566 ZZH SEVOFLURANE, EA 15 MIN: Performed by: ORTHOPAEDIC SURGERY

## 2017-12-04 PROCEDURE — 25000125 ZZHC RX 250: Performed by: NURSE ANESTHETIST, CERTIFIED REGISTERED

## 2017-12-04 PROCEDURE — 37000009 ZZH ANESTHESIA TECHNICAL FEE, EACH ADDTL 15 MIN: Performed by: ORTHOPAEDIC SURGERY

## 2017-12-04 PROCEDURE — 25000125 ZZHC RX 250: Performed by: ORTHOPAEDIC SURGERY

## 2017-12-04 PROCEDURE — 25000125 ZZHC RX 250: Performed by: INTERNAL MEDICINE

## 2017-12-04 PROCEDURE — 00BT0ZZ EXCISION OF SPINAL MENINGES, OPEN APPROACH: ICD-10-PCS | Performed by: ORTHOPAEDIC SURGERY

## 2017-12-04 PROCEDURE — 40000170 ZZH STATISTIC PRE-PROCEDURE ASSESSMENT II: Performed by: ORTHOPAEDIC SURGERY

## 2017-12-04 PROCEDURE — 0SB00ZZ EXCISION OF LUMBAR VERTEBRAL JOINT, OPEN APPROACH: ICD-10-PCS | Performed by: ORTHOPAEDIC SURGERY

## 2017-12-04 PROCEDURE — 12000000 ZZH R&B MED SURG/OB

## 2017-12-04 PROCEDURE — 71000012 ZZH RECOVERY PHASE 1 LEVEL 1 FIRST HR: Performed by: ORTHOPAEDIC SURGERY

## 2017-12-04 PROCEDURE — A9270 NON-COVERED ITEM OR SERVICE: HCPCS | Mod: GY | Performed by: PHYSICIAN ASSISTANT

## 2017-12-04 PROCEDURE — 25000128 H RX IP 250 OP 636: Performed by: ANESTHESIOLOGY

## 2017-12-04 PROCEDURE — 36000056 ZZH SURGERY LEVEL 3 1ST 30 MIN: Performed by: ORTHOPAEDIC SURGERY

## 2017-12-04 PROCEDURE — 36415 COLL VENOUS BLD VENIPUNCTURE: CPT | Performed by: INTERNAL MEDICINE

## 2017-12-04 PROCEDURE — 25800025 ZZH RX 258: Performed by: ORTHOPAEDIC SURGERY

## 2017-12-04 PROCEDURE — 85025 COMPLETE CBC W/AUTO DIFF WBC: CPT | Performed by: INTERNAL MEDICINE

## 2017-12-04 PROCEDURE — 27210794 ZZH OR GENERAL SUPPLY STERILE: Performed by: ORTHOPAEDIC SURGERY

## 2017-12-04 PROCEDURE — 99207 ZZC CDG-MDM COMPONENT: MEETS MODERATE - UP CODED: CPT | Performed by: INTERNAL MEDICINE

## 2017-12-04 PROCEDURE — 25000128 H RX IP 250 OP 636: Performed by: NURSE ANESTHETIST, CERTIFIED REGISTERED

## 2017-12-04 PROCEDURE — 36000058 ZZH SURGERY LEVEL 3 EA 15 ADDTL MIN: Performed by: ORTHOPAEDIC SURGERY

## 2017-12-04 PROCEDURE — 25000128 H RX IP 250 OP 636: Performed by: ORTHOPAEDIC SURGERY

## 2017-12-04 PROCEDURE — 27210995 ZZH RX 272: Performed by: ORTHOPAEDIC SURGERY

## 2017-12-04 PROCEDURE — S0032 INJECTION, NAFCILLIN SODIUM: HCPCS | Performed by: INTERNAL MEDICINE

## 2017-12-04 PROCEDURE — 99233 SBSQ HOSP IP/OBS HIGH 50: CPT | Performed by: INTERNAL MEDICINE

## 2017-12-04 PROCEDURE — 37000008 ZZH ANESTHESIA TECHNICAL FEE, 1ST 30 MIN: Performed by: ORTHOPAEDIC SURGERY

## 2017-12-04 RX ORDER — CEFAZOLIN SODIUM 2 G/100ML
2 INJECTION, SOLUTION INTRAVENOUS
Status: COMPLETED | OUTPATIENT
Start: 2017-12-04 | End: 2017-12-04

## 2017-12-04 RX ORDER — HYDROMORPHONE HYDROCHLORIDE 1 MG/ML
.3-.5 INJECTION, SOLUTION INTRAMUSCULAR; INTRAVENOUS; SUBCUTANEOUS EVERY 30 MIN PRN
Status: DISCONTINUED | OUTPATIENT
Start: 2017-12-04 | End: 2017-12-07 | Stop reason: HOSPADM

## 2017-12-04 RX ORDER — GLYCOPYRROLATE 0.2 MG/ML
INJECTION, SOLUTION INTRAMUSCULAR; INTRAVENOUS PRN
Status: DISCONTINUED | OUTPATIENT
Start: 2017-12-04 | End: 2017-12-04

## 2017-12-04 RX ORDER — PROPOFOL 10 MG/ML
INJECTION, EMULSION INTRAVENOUS PRN
Status: DISCONTINUED | OUTPATIENT
Start: 2017-12-04 | End: 2017-12-04

## 2017-12-04 RX ORDER — ONDANSETRON 4 MG/1
4 TABLET, ORALLY DISINTEGRATING ORAL EVERY 30 MIN PRN
Status: DISCONTINUED | OUTPATIENT
Start: 2017-12-04 | End: 2017-12-04 | Stop reason: HOSPADM

## 2017-12-04 RX ORDER — EPHEDRINE SULFATE 50 MG/ML
INJECTION, SOLUTION INTRAMUSCULAR; INTRAVENOUS; SUBCUTANEOUS PRN
Status: DISCONTINUED | OUTPATIENT
Start: 2017-12-04 | End: 2017-12-04

## 2017-12-04 RX ORDER — ONDANSETRON 2 MG/ML
INJECTION INTRAMUSCULAR; INTRAVENOUS PRN
Status: DISCONTINUED | OUTPATIENT
Start: 2017-12-04 | End: 2017-12-04

## 2017-12-04 RX ORDER — SODIUM CHLORIDE, SODIUM LACTATE, POTASSIUM CHLORIDE, CALCIUM CHLORIDE 600; 310; 30; 20 MG/100ML; MG/100ML; MG/100ML; MG/100ML
INJECTION, SOLUTION INTRAVENOUS CONTINUOUS
Status: DISCONTINUED | OUTPATIENT
Start: 2017-12-04 | End: 2017-12-04 | Stop reason: HOSPADM

## 2017-12-04 RX ORDER — METOCLOPRAMIDE HYDROCHLORIDE 5 MG/ML
INJECTION INTRAMUSCULAR; INTRAVENOUS PRN
Status: DISCONTINUED | OUTPATIENT
Start: 2017-12-04 | End: 2017-12-04

## 2017-12-04 RX ORDER — FENTANYL CITRATE 50 UG/ML
INJECTION, SOLUTION INTRAMUSCULAR; INTRAVENOUS PRN
Status: DISCONTINUED | OUTPATIENT
Start: 2017-12-04 | End: 2017-12-04

## 2017-12-04 RX ORDER — NEOSTIGMINE METHYLSULFATE 1 MG/ML
VIAL (ML) INJECTION PRN
Status: DISCONTINUED | OUTPATIENT
Start: 2017-12-04 | End: 2017-12-04

## 2017-12-04 RX ORDER — LIDOCAINE HYDROCHLORIDE 20 MG/ML
INJECTION, SOLUTION INFILTRATION; PERINEURAL PRN
Status: DISCONTINUED | OUTPATIENT
Start: 2017-12-04 | End: 2017-12-04

## 2017-12-04 RX ORDER — HYDROMORPHONE HYDROCHLORIDE 1 MG/ML
.3-.5 INJECTION, SOLUTION INTRAMUSCULAR; INTRAVENOUS; SUBCUTANEOUS EVERY 5 MIN PRN
Status: DISCONTINUED | OUTPATIENT
Start: 2017-12-04 | End: 2017-12-04 | Stop reason: HOSPADM

## 2017-12-04 RX ORDER — FENTANYL CITRATE 50 UG/ML
25-50 INJECTION, SOLUTION INTRAMUSCULAR; INTRAVENOUS
Status: DISCONTINUED | OUTPATIENT
Start: 2017-12-04 | End: 2017-12-04 | Stop reason: HOSPADM

## 2017-12-04 RX ORDER — ONDANSETRON 2 MG/ML
4 INJECTION INTRAMUSCULAR; INTRAVENOUS EVERY 30 MIN PRN
Status: DISCONTINUED | OUTPATIENT
Start: 2017-12-04 | End: 2017-12-04 | Stop reason: HOSPADM

## 2017-12-04 RX ADMIN — PROPOFOL 150 MG: 10 INJECTION, EMULSION INTRAVENOUS at 09:54

## 2017-12-04 RX ADMIN — HYDROMORPHONE HYDROCHLORIDE 0.3 MG: 1 INJECTION, SOLUTION INTRAMUSCULAR; INTRAVENOUS; SUBCUTANEOUS at 11:58

## 2017-12-04 RX ADMIN — METOCLOPRAMIDE HYDROCHLORIDE 10 MG: 5 INJECTION INTRAMUSCULAR; INTRAVENOUS at 10:13

## 2017-12-04 RX ADMIN — NAFCILLIN 2 G: 2 POWDER, FOR SOLUTION INTRAMUSCULAR; INTRAVENOUS at 06:49

## 2017-12-04 RX ADMIN — NAFCILLIN 2 G: 2 POWDER, FOR SOLUTION INTRAMUSCULAR; INTRAVENOUS at 15:04

## 2017-12-04 RX ADMIN — LIDOCAINE HYDROCHLORIDE 100 MG: 20 INJECTION, SOLUTION INFILTRATION; PERINEURAL at 09:54

## 2017-12-04 RX ADMIN — Medication 5 MG: at 10:55

## 2017-12-04 RX ADMIN — Medication 5 MG: at 10:40

## 2017-12-04 RX ADMIN — HYDROMORPHONE HYDROCHLORIDE 0.2 MG: 1 INJECTION, SOLUTION INTRAMUSCULAR; INTRAVENOUS; SUBCUTANEOUS at 11:59

## 2017-12-04 RX ADMIN — Medication 5 MG: at 10:17

## 2017-12-04 RX ADMIN — OXYCODONE HYDROCHLORIDE 5 MG: 5 TABLET ORAL at 21:17

## 2017-12-04 RX ADMIN — NAFCILLIN 2 G: 2 POWDER, FOR SOLUTION INTRAMUSCULAR; INTRAVENOUS at 23:05

## 2017-12-04 RX ADMIN — NAFCILLIN 2 G: 2 POWDER, FOR SOLUTION INTRAMUSCULAR; INTRAVENOUS at 02:51

## 2017-12-04 RX ADMIN — FENTANYL CITRATE 100 MCG: 50 INJECTION, SOLUTION INTRAMUSCULAR; INTRAVENOUS at 09:54

## 2017-12-04 RX ADMIN — PHENYLEPHRINE HYDROCHLORIDE 100 MCG: 10 INJECTION INTRAVENOUS at 10:21

## 2017-12-04 RX ADMIN — NAFCILLIN 2 G: 2 POWDER, FOR SOLUTION INTRAMUSCULAR; INTRAVENOUS at 16:29

## 2017-12-04 RX ADMIN — SODIUM CHLORIDE, POTASSIUM CHLORIDE, SODIUM LACTATE AND CALCIUM CHLORIDE: 600; 310; 30; 20 INJECTION, SOLUTION INTRAVENOUS at 09:28

## 2017-12-04 RX ADMIN — ROCURONIUM BROMIDE 50 MG: 10 INJECTION INTRAVENOUS at 09:54

## 2017-12-04 RX ADMIN — HYDROMORPHONE HYDROCHLORIDE 0.2 MG: 1 INJECTION, SOLUTION INTRAMUSCULAR; INTRAVENOUS; SUBCUTANEOUS at 08:05

## 2017-12-04 RX ADMIN — NIFEDIPINE 90 MG: 90 TABLET, FILM COATED, EXTENDED RELEASE ORAL at 08:38

## 2017-12-04 RX ADMIN — CEFAZOLIN SODIUM 2 G: 2 INJECTION, SOLUTION INTRAVENOUS at 10:07

## 2017-12-04 RX ADMIN — CLONIDINE HYDROCHLORIDE 0.1 MG: 0.1 TABLET ORAL at 21:16

## 2017-12-04 RX ADMIN — NAFCILLIN 2 G: 2 POWDER, FOR SOLUTION INTRAMUSCULAR; INTRAVENOUS at 20:01

## 2017-12-04 RX ADMIN — CARVEDILOL 25 MG: 25 TABLET, FILM COATED ORAL at 21:17

## 2017-12-04 RX ADMIN — CARVEDILOL 25 MG: 25 TABLET, FILM COATED ORAL at 08:38

## 2017-12-04 RX ADMIN — NEOSTIGMINE METHYLSULFATE 5 MG: 1 INJECTION INTRAMUSCULAR; INTRAVENOUS; SUBCUTANEOUS at 11:53

## 2017-12-04 RX ADMIN — MIDAZOLAM HYDROCHLORIDE 1 MG: 1 INJECTION, SOLUTION INTRAMUSCULAR; INTRAVENOUS at 09:46

## 2017-12-04 RX ADMIN — MIDAZOLAM HYDROCHLORIDE 1 MG: 1 INJECTION, SOLUTION INTRAMUSCULAR; INTRAVENOUS at 09:42

## 2017-12-04 RX ADMIN — SODIUM CHLORIDE, POTASSIUM CHLORIDE, SODIUM LACTATE AND CALCIUM CHLORIDE: 600; 310; 30; 20 INJECTION, SOLUTION INTRAVENOUS at 12:30

## 2017-12-04 RX ADMIN — GLYCOPYRROLATE 1 MG: 0.2 INJECTION, SOLUTION INTRAMUSCULAR; INTRAVENOUS at 11:53

## 2017-12-04 RX ADMIN — ONDANSETRON 4 MG: 2 INJECTION INTRAMUSCULAR; INTRAVENOUS at 10:41

## 2017-12-04 ASSESSMENT — ACTIVITIES OF DAILY LIVING (ADL)
ADLS_ACUITY_SCORE: 9

## 2017-12-04 ASSESSMENT — COPD QUESTIONNAIRES: COPD: 0

## 2017-12-04 ASSESSMENT — LIFESTYLE VARIABLES: TOBACCO_USE: 0

## 2017-12-04 ASSESSMENT — VISUAL ACUITY: OU: NORMAL ACUITY

## 2017-12-04 NOTE — ANESTHESIA CARE TRANSFER NOTE
Patient: Mary Dietzel    Procedure(s):  IRRIGATION AND DEBRIDEMENT LUMBAR INFECTION - Wound Class: II-Clean Contaminated    Diagnosis: UNKNOWN  Diagnosis Additional Information: No value filed.    Anesthesia Type:   General, ETT     Note:  Airway :Face Mask  Patient transferred to:PACU  Comments: Patient spontaneously breathing and following commands. VSS. Report given to RN.Handoff Report: Identifed the Patient, Identified the Reponsible Provider, Reviewed the pertinent medical history, Discussed the surgical course, Reviewed Intra-OP anesthesia mangement and issues during anesthesia, Set expectations for post-procedure period and Allowed opportunity for questions and acknowledgement of understanding      Vitals: (Last set prior to Anesthesia Care Transfer)    CRNA VITALS  12/4/2017 1149 - 12/4/2017 1226      12/4/2017             NIBP: 130/69    Pulse: 73    NIBP Mean: 103    SpO2: 98 %    Resp Rate (set): 10                Electronically Signed By: ANETTE Wise CRNA  December 4, 2017  12:26 PM

## 2017-12-04 NOTE — PROGRESS NOTES
[facility]    Hospitalist Progress Note  Fred Parker MD    Assessment & Plan     72-year-old female, with past medical history of CKD stage III, insulin-dependent type 2 diabetes mellitus, anxiety, hypertension, hyperlipidemia, hypothyroidism, who presented on 12/1/17 with increasing low back pain, bilateral hips with radiation to the buttocks since 11/24/17. Her pain was exacerbated with movement. Patient has history of intermittent urinary incontinence, since onset of her pain. Work up done on 12/2/17 revealed, BMP significant for Creat 1.86. CBC revealed, Hb 11, WBC 18 and Plts 342. EKG done on 12/2/17 revealed, sinus rhythm rate rate 89/min. CT lumbar spine on 12/2/17 revealed, prior posterior decompressive procedure at L3-L4, with abnormal fluid and/or soft tissue protruding posteriorly into the canal causing moderate central canal stenosis. There is no definite evidence for osteomyelitis. Prior posterior instrumentation fusion from L4 to S1 the fusion appears solid on the right at L5-S1.      1. POD# 2 Revision decompression, L3-4, with epidural abscess excision and removal, excision of bilateral synovial cysts L3-4, and revision laminotomy of L4:  Post op MRI lumbar spine on 12/3/17 revealed, new postoperative change at the L3-4 level. Complex material in the posterior epidural space. This  could represent postoperative hemorrhage. Residual epidural abscess cannot be excluded. This is causing moderate central stenosis. Posterior instrumentation extending from L4-sacrum. For management per Spine Surgery. ID was consulted. Blood cultures on 12/2/17 was negative. Abscess culture on 12/2/17 revealed, heavy growth staph aureus. Patient underwent re-exploration of L3-L4 infection, excision of right L3-4 synovial cyst, laminotomy of L3 and L4, removal of scar tissue an phlegman irrigation and debridgement. Continue IV Nafcillin.      2. Hypertension: Controlled. Continue lisinopril  and Nifedipine ER.       3. Type 2 diabetes mellitus, insulin-dependent: BG was 139 this am. Continue Lantus q.a.m. and Metformin b.i.d.   For medium dose insulin aspart sliding scale prn. HbA1C was 6.7% on 12/2/17.       4. CKD, stage III: Creatinine was 1.86-->2.2 on 12/4/17. Held lasix today -12/4/17.  Avoid nephrotoxic agents. Monitor BMP.      5. Hypothyroidism:  Continue levothyroxine.       6. Anxiety:  Continue fluoxetine.      7. Obstructive sleep apnea: Continue CPAP, with home setting.       DVT Prophylaxis: Pneumatic Compression Devices  Code Status: Full Code      Interval History   The pt was seen postop. She reported having dry lips. No complaints of back pain.    -Data reviewed today: I reviewed all new labs and imaging results over the last 24 hours. I personally reviewed no images or EKG's today.    Physical Exam   Temp: 97.7  F (36.5  C) Temp src: Oral BP: 128/65 Pulse: 92 Heart Rate: 74 Resp: 14 SpO2: 94 % O2 Device: Nasal cannula Oxygen Delivery: 2 LPM  Vitals:    12/02/17 0655 12/02/17 1710 12/03/17 0605   Weight: 104.9 kg (231 lb 4.2 oz) 104.9 kg (231 lb 4.2 oz) 109 kg (240 lb 4.8 oz)     Vital Signs with Ranges  Temp:  [97.5  F (36.4  C)-99.1  F (37.3  C)] 97.7  F (36.5  C)  Pulse:  [92] 92  Heart Rate:  [64-84] 74  Resp:  [14-23] 14  BP: (100-128)/(42-68) 128/65  SpO2:  [90 %-94 %] 94 %  I/O last 3 completed shifts:  In: 2433 [P.O.:720; I.V.:1713]  Out: 1075 [Urine:1075]    Constitutional: Elderly white female, awake, cooperative, no apparent distress, O2 Sats 94% on 3L via NC  Respiratory: BS vesicular bilaterally, no crackles or wheezing  Cardiovascular: S1 and S2, reg, no murmur noted  GI: Soft, non-distended, non-tender, no masses, BS present+  Skin/Integumen: No rashes  Extremities: No pedal edema    Medications     NaCl 75 mL/hr at 12/03/17 1955       nafcillin  2 g Intravenous Q4H     insulin glargine  10 Units Subcutaneous QAM AC     sodium chloride (PF)  3 mL Intracatheter Q8H      insulin aspart  1-7 Units Subcutaneous TID AC     insulin aspart  1-5 Units Subcutaneous At Bedtime     influenza Vac Split High-Dose  0.5 mL Intramuscular Prior to discharge     lisinopril  40 mg Oral Daily     carvedilol (COREG) tablet 25 mg  25 mg Oral BID     FLUoxetine (PROzac) capsule 20 mg  20 mg Oral Daily     furosemide (LASIX) tablet 40 mg  40 mg Oral Daily     levothyroxine (SYNTHROID/LEVOTHROID) tablet 175 mcg  175 mcg Oral Daily     NIFEdipine ER osmotic  90 mg Oral Daily     cloNIDine (CATAPRES) tablet 0.1 mg  0.1 mg Oral BID       Data     Recent Labs  Lab 12/04/17  0818 12/03/17  0728 12/02/17  0719   WBC 9.9 14.5* 18.0*   HGB 9.5* 9.3* 11.0*   MCV 86 87 86    324 342    136 136   POTASSIUM 3.5 3.9 3.9   CHLORIDE 102 101 100   CO2 29 28 29   BUN 29 25 20   CR 2.20* 2.10* 1.86*   ANIONGAP 8 7 7   CHAIM 8.3* 8.1* 8.9   * 173* 244*       Recent Results (from the past 24 hour(s))   MR Lumbar Spine w/o Contrast    Narrative    MR LUMBAR SPINE WITHOUT [id number]/3/2017 7:38 PM      HISTORY: Status post epidural abscess evacuation, check to make sure  adequate decompression.     TECHNIQUE:  Multiplanar, multisequence MRI of the lumbar spine without  contrast. No contrast was administered because of the patient's low  GFR.    COMPARISON: CT dated 12/2/2017    FINDINGS:This report assumes five lumbar type vertebrae.     Patient is status post a posterior decompression and fusion with  instrumentation extending from L4-S1. The patient is status post a new  laminectomy at the L3-4 level. There is a complex collection in the  posterior epidural space at the L3-4 level. This is low in signal on  T1 and high in signal intensity on T2. It extends from the mid L3  level to the mid L4 level. It measures about 4 cm in cephalocaudad  dimension. It is causing mass effect on the posterior dural sac. The  residual AP diameter of the central canal at this level is about 7 mm.  This material is  nonspecific. It could represent postoperative  hemorrhage but it could also be due to residual epidural abscess.  Postop change is seen in the posterior soft tissues extending from  L2-L5.    L1-L2:  Degeneration of the disc. Mild annular disc bulge.    L2-L3:  Degeneration of the disc. Mild annular disc bulge.    L3-L4:  Degeneration of the disc. Mild annular disc bulge. Posterior  laminectomy. Soft tissue material in the posterior epidural space  causing mild mass effect on the dural sac. This could represent  postoperative hematoma but a residual epidural abscess cannot be  excluded.    L4-L5:  Posterior instrumentation. Degeneration of the disc. Mild  bulge.    L5-S1:  Degeneration of the disc. Posterior instrumentation.      Impression    IMPRESSION:   1. New postoperative change at the L3-4 level. Patient status post a  laminectomy. Complex material in the posterior epidural space. This  could represent postoperative hemorrhage. Residual epidural abscess  cannot be excluded. This is causing moderate central stenosis.  2. Posterior instrumentation extending from L4-sacrum.   3. No contrast was administered because of the patient's low GFR.    CATHY NOLEN MD

## 2017-12-04 NOTE — PROGRESS NOTES
[facility]    Infectious Disease Progress Note    Date of Service (when I saw the patient): 12/04/2017     Assessment & Plan   Mary Baker is a 72 year old female who was admitted on 12/1/2017.     Impression:  1. 72 y.o female with history of Laminectomy and fusion in 2016.   2. Presenting this occasion with back pain with imaging consistent with epidural abscess. S/P Revision decompression, L3-4, with epidural abscess excision and removal, excision of bilateral synovial cysts L3-4, and revision laminotomy of L4.   3. CKD with creat now up to 2.01 on vancomycin.   4. GPC in clusters in the cultures from the abscesses, further identified as MSSA, blood cultures NGTD so far.      Recommendations:   Continue Nafcillin, covers MSSA, no MRSA does not need any more vanco.   Will follow up on the pending cultures.   Close eye on the creat      Desi Perez MD    Interval History   Cultures positive for MSSA     Physical Exam   Temp: 97.7  F (36.5  C) Temp src: Oral BP: 128/65 Pulse: 92 Heart Rate: 74 Resp: 14 SpO2: 92 % O2 Device: None (Room air) Oxygen Delivery: 2 LPM  Vitals:    12/02/17 0655 12/02/17 1710 12/03/17 0605   Weight: 104.9 kg (231 lb 4.2 oz) 104.9 kg (231 lb 4.2 oz) 109 kg (240 lb 4.8 oz)     Vital Signs with Ranges  Temp:  [97.5  F (36.4  C)-99.1  F (37.3  C)] 97.7  F (36.5  C)  Pulse:  [92] 92  Heart Rate:  [64-84] 74  Resp:  [14-23] 14  BP: (100-128)/(42-68) 128/65  SpO2:  [90 %-94 %] 92 %    Constitutional: Awake, alert, cooperative, no apparent distress  Lungs: Clear to auscultation bilaterally, no crackles or wheezing  Cardiovascular: Regular rate and rhythm, normal S1 and S2, and no murmur noted  Abdomen: Normal bowel sounds, soft, non-distended, non-tender  Skin: No rashes, no cyanosis, no edema  Other:    Medications     lactated ringers 25 mL/hr at 12/04/17 0928     NaCl 75 mL/hr at 12/03/17 1955       ceFAZolin  1 g Intravenous See Admin Instructions     [Auto Hold] nafcillin   2 g Intravenous Q4H     [Auto Hold] insulin glargine  10 Units Subcutaneous QAM AC     [Auto Hold] sodium chloride (PF)  3 mL Intracatheter Q8H     [Auto Hold] insulin aspart  1-7 Units Subcutaneous TID AC     [Auto Hold] insulin aspart  1-5 Units Subcutaneous At Bedtime     influenza Vac Split High-Dose  0.5 mL Intramuscular Prior to discharge     [Auto Hold] lisinopril  40 mg Oral Daily     [Auto Hold] carvedilol (COREG) tablet 25 mg  25 mg Oral BID     [Auto Hold] FLUoxetine (PROzac) capsule 20 mg  20 mg Oral Daily     [Auto Hold] levothyroxine (SYNTHROID/LEVOTHROID) tablet 175 mcg  175 mcg Oral Daily     [Auto Hold] NIFEdipine ER osmotic  90 mg Oral Daily     [Auto Hold] cloNIDine (CATAPRES) tablet 0.1 mg  0.1 mg Oral BID       Data   All microbiology laboratory data reviewed.  Recent Labs   Lab Test  12/04/17   0818  12/03/17   0728  12/02/17   0719   WBC  9.9  14.5*  18.0*   HGB  9.5*  9.3*  11.0*   HCT  28.2*  27.6*  32.3*   MCV  86  87  86   PLT  304  324  342     Recent Labs   Lab Test  12/04/17   0818  12/03/17   0728  12/02/17   0719   CR  2.20*  2.10*  1.86*     Recent Labs   Lab Test  09/06/12   0917   SED  30     Recent Labs   Lab Test  12/02/17   1312  12/01/17   2025  12/01/17   2020  05/04/11   1326  04/25/11   0938  06/25/10   1016   CULT  Heavy growth  Staphylococcus aureus  *  Culture negative monitoring continues  No growth after 3 days  No growth after 3 days  <10,000 colonies/mL Staphylococcus aureus Plus 10 to 50,000 colonies/mL Multiple species present, probable perineal  contamination.  10 to 50,000 colonies/mL Multiple species present, probable perineal  contamination.  10 to 50,000 colonies/mL Multiple species present, probable perineal  contamination.

## 2017-12-04 NOTE — PLAN OF CARE
Problem: Patient Care Overview  Goal: Plan of Care/Patient Progress Review  Outcome: Adequate for Discharge Date Met: 12/04/17  VSS, back from I and D of lumbar area approx 1415, remains pain free, capnography monitoring in progress, IPI has been 6 since arrival. 3 lumbar drainage tubes lead to 2 accordian style drains, marked. Alert, family at bedside. Ice chips.

## 2017-12-04 NOTE — PROGRESS NOTES
"I saw Mary Baker at the [facility] on Sunday, 12/3/2017 for fitting and delivery of a lumbosacral orthosis (LSO) per Rx from Dejah Mesa PA-C. Mary was scheduled for lumbar surgery the following day. She I saw her with her RN. Orthosis is to provide improved support and comfort post surgical. I fit a [employer] model [id number] 48-60\" 4 panel Velcro closure LSO. Mary was familiar with this brace and demonstrated proficiency with donning. I have provided written and verbal instruction regarding use and care of the orthosis. Follow up PRN.  "

## 2017-12-04 NOTE — ANESTHESIA POSTPROCEDURE EVALUATION
Patient: Mary Dietzel    Procedure(s):  IRRIGATION AND DEBRIDEMENT LUMBAR INFECTION - Wound Class: II-Clean Contaminated    Diagnosis:UNKNOWN  Diagnosis Additional Information: No value filed.    Anesthesia Type:  General, ETT    Note:  Anesthesia Post Evaluation    Patient location during evaluation: PACU  Patient participation: Able to fully participate in evaluation  Level of consciousness: awake and alert  Pain management: satisfactory to patient  Airway patency: patent  Cardiovascular status: hemodynamically stable  Respiratory status: acceptable and unassisted  Hydration status: balanced  PONV: none     Anesthetic complications: None          Last vitals:  Vitals:    12/04/17 1330 12/04/17 1340 12/04/17 1350   BP: 136/74 138/68 146/73   Pulse:      Resp: 18 17 17   Temp:      SpO2: 96% 95% 96%         Electronically Signed By: Albert Salazar MD  December 4, 2017  2:03 PM

## 2017-12-04 NOTE — PROVIDER NOTIFICATION
MD Notification    Notified Person:  MD    Notified Persons Name:  Pierre    Notification Date/Time: 12/3/2017 8:55 PM     Notification Interaction:  Talked with Physician    Purpose of Notification: dressing saturated after MRI. Redressed by nurse, leakage is from incision between second and third staple from top of incision (closest to thoracic area). MICHAEL not putting out anything. MRI shows area which is residual abscess vs hematoma vs hemmorhage    Orders Received: Strip the drain tubing once. Continue to monitor. Some drainage from incision is expected.    Comments:

## 2017-12-04 NOTE — PROVIDER NOTIFICATION
MD Notification    Person notified: primary hospitalist    Person Name: Dr. Pearson    Date/Time: 12/4/17 at 1600    Interaction: web-based page    Purpose of Notification: Pt does not have a diet order post surgery- could you please place one?  Pt has been tolerating ice chips well.    Orders Received:    Comments:

## 2017-12-04 NOTE — PLAN OF CARE
Problem: Patient Care Overview  Goal: Plan of Care/Patient Progress Review  PT/OT: Orders received, chart reviewed, discussed with medical team. Plan for return to OR for I&D today. Will hold therapy evals until tomorrow.

## 2017-12-04 NOTE — PLAN OF CARE
Problem: Patient Care Overview  Goal: Plan of Care/Patient Progress Review  Outcome: No Change   A&Ox4. CMS intact, BLE numbness baseline per pt. Bowel sounds hypoactive, no flatus no BM overnight. VSS, 3L O2 nasal canula. Holcomb patent. Dressing changed 1x overnight, minimal drainage dressing marked and no change overnight. Hemovac 0 drainage overnight. Brace on OOB.  Pain 6/10, declined any intervention. Pt was not up for me overnight. NPO due to surgery today. Plan I & D 12/4.

## 2017-12-04 NOTE — BRIEF OP NOTE
[facility]  Spinal Surgery Brief Operative Note    Pre-operative diagnosis: EPIDURAL ABSCESS AND SEVERE SPINAL STENOSIS   Post-operative diagnosis: Same   Procedure: RE-EXPLORATION OF L3-4 INFECTION  EXCISION OF RIGHT L3-4 SYNOVIAL CYST  ADDITIONAL LAMINOTOMY OF L3 AND L4  REMOVAL OF SCAR TISSUE AND PHLEGMAN  IRRIGATION AND DEBRIDEMENT   Surgeon: FRANCISCO JAVIER PEREZ MD   Assistant(s): JIMMY BONILLA PA-C   Anesthesia: General endotracheal anesthesia   Estimated blood loss: 100 ml   Total IV fluids: (See anesthesia record)   Blood transfusion: NONE   Drains: Hemovac X3   Specimens: NONE   Implants: AS ABOVE   Findings: NO PURULENCE , EPIDURAL HEMATOMA, TIGHT SYNOVIAL CYST L4-3   Complications: NONE   Condition: STABLE   Comments: SEE DICTATED OPERATIVE REPORT FOR DETAILS

## 2017-12-04 NOTE — ANESTHESIA PREPROCEDURE EVALUATION
Anesthesia Evaluation     . Pt has had prior anesthetic. Type: General    No history of anesthetic complications          ROS/MED HX    ENT/Pulmonary:     (+)sleep apnea, , . .   (-) tobacco use, asthma and COPD   Neurologic:       Cardiovascular:     (+) Dyslipidemia, hypertension----. : . . . :. .      (-) CAD   METS/Exercise Tolerance:     Hematologic:         Musculoskeletal:         GI/Hepatic:        (-) GERD   Renal/Genitourinary:     (+) chronic renal disease, type: CRI,       Endo:     (+) type II DM thyroid problem hypothyroidism, Obesity, .      Psychiatric:     (+) psychiatric history depression      Infectious Disease:         Malignancy:         Other:                     Physical Exam  Normal systems: cardiovascular, pulmonary and dental    Airway   Mallampati: III  TM distance: >3 FB  Neck ROM: full    Dental     Cardiovascular       Pulmonary                         Anesthesia Plan      History & Physical Review  History and physical reviewed and following examination; no interval change.    ASA Status:  3 .    NPO Status:  > 8 hours    Plan for General and ETT with Intravenous induction. Maintenance will be Inhalation.    PONV prophylaxis:  Ondansetron (or other 5HT-3) and Promethazine or metoclopramide  Additional equipment: Videolaryngoscope Blood glucose checks      Postoperative Care  Postoperative pain management:  IV analgesics.      Consents  Anesthetic plan, risks, benefits and alternatives discussed with:  Patient..        Procedure: Procedure(s):  IRRIGATION AND DEBRIDEMENT BACK  Preop diagnosis: UNKNOWN    Allergies   Allergen Reactions     Pravastatin Sodium Cramps     Effexor [Venlafaxine Hydrochloride]      Procardia [Nifedipine]      Past Medical History:   Diagnosis Date     Abnormal kidney function study      Chronic anxiety      Corneal abrasion 9/14/2015     Depressive disorder, not elsewhere classified      Edema      Essential hypertension, benign      Female hirsutism       Gluten-sensitive enteropathy      Obesity, unspecified      Other proteinuria      Pure hypercholesterolemia      Sciatica      Sleep apnea     uses c-pap     Spinal stenosis, lumbar region, without neurogenic claudication      Type II or unspecified type diabetes mellitus without mention of complication, not stated as uncontrolled      Unspecified hypothyroidism      Past Surgical History:   Procedure Laterality Date     LAMINECTOMY, FUSION LUMBAR TWO LEVELS, COMBINED N/A 5/9/2016    Procedure: COMBINED LAMINECTOMY, FUSION LUMBAR TWO LEVELS;  Surgeon: Vishal Jay MD;  Location:  OR     SURGICAL HISTORY OF - [id number]    Endometrial Bx     SURGICAL HISTORY OF - 11/03    Right middle finger trigger release     Prior to Admission medications    Medication Sig Start Date End Date Taking? Authorizing Provider   CLONIDINE HCL PO Take 0.2 mg by mouth At Bedtime Patient takes 0.1mg in the afternoon and 0.2mg at bedtime   Yes Unknown, Entered By History   METFORMIN HCL PO Take 1,000 mg by mouth 2 times daily (with meals) takes 2 x 500mg tablets   Yes Unknown, Entered By History   Acetaminophen (TYLENOL PO) Take 1,500 mg by mouth daily as needed for mild pain or fever   Yes Reported, Patient   LORAZEPAM PO Take 1 mg by mouth 2 times daily as needed    Yes Reported, Patient   Omega-3 Fatty Acids (OMEGA-3 FISH OIL PO) Take 1 capsule by mouth twice a week    Reported, Patient   FLUOXETINE HCL PO Take 20 mg by mouth daily    Reported, Patient   FUROSEMIDE PO Take 40 mg by mouth daily (Takes 2 x 20mg tablet)    Reported, Patient   BENAZEPRIL HCL PO Take 40 mg by mouth daily    Reported, Patient   LEVOTHYROXINE SODIUM PO Take 175 mcg by mouth daily    Reported, Patient   CLONIDINE HCL PO Take 0.1 mg by mouth daily Patient takes 0.1mg in the afternoon and 0.2mg at bedtime    Reported, Patient   CARVEDILOL PO Take 25 mg by mouth 2 times daily In the Afternoon and at Bedtime    Reported, Patient   PRAVASTATIN SODIUM PO Take  40 mg by mouth every evening    Reported, Patient   insulin pen needle ([employer]ARE SHORT PEN NEEDLES) 31G X 8 MM MISC Use BID 1/8/14   Pia Peña NP   Glucose Blood (ACCU-CHEK SHANTEL) STRP Pt tests 4 times daily before meals and nightly 5/9/13   Pia Peña NP   LANTUS SOLOSTAR 100 UNIT/ML injection Inject 47 Units Subcutaneous every morning  4/29/13   Pia Peña NP   Blood Pressure Monitor KIT 1 Device daily. Check BP daily 4/29/13   Pia Peña NP   aspirin 81 MG tablet Take 1 tablet by mouth every evening  9/22/11   Cooper Almendarez MD     Current Facility-Administered Medications Ordered in Epic   Medication Dose Route Frequency Last Rate Last Dose     ceFAZolin (ANCEF) intermittent infusion 2 g in 100 mL dextrose PRE-MIX  2 g Intravenous Pre-Op/Pre-procedure x 1 dose         ceFAZolin (ANCEF) intermittent infusion 1 g (pre-mix)  1 g Intravenous See Admin Instructions         [Auto Hold] nafcillin IV 2 g vial to attach to  ml bag  2 g Intravenous Q4H   2 g at 12/04/17 0649     [Auto Hold] insulin glargine (LANTUS) injection 10 Units  10 Units Subcutaneous QAM AC   10 Units at 12/03/17 0829     [Auto Hold] ondansetron (ZOFRAN-ODT) ODT tab 4 mg  4 mg Oral Q30 Min PRN         [Auto Hold] lidocaine 1 % 1 mL  1 mL Other Q1H PRN         [Auto Hold] lidocaine (LMX4) cream   Topical Q1H PRN         [Auto Hold] sodium chloride (PF) 0.9% PF flush 3 mL  3 mL Intracatheter Q1H PRN         [Auto Hold] sodium chloride (PF) 0.9% PF flush 3 mL  3 mL Intracatheter Q8H   3 mL at 12/04/17 0248     0.9% sodium chloride infusion   Intravenous Continuous 75 mL/hr at 12/03/17 1955       [Auto Hold] naloxone (NARCAN) injection 0.1-0.4 mg  0.1-0.4 mg Intravenous Q2 Min PRN         [Auto Hold] diphenhydrAMINE (BENADRYL) solution 12.5 mg  12.5 mg Oral Q6H PRN        Or     [Auto Hold] diphenhydrAMINE (BENADRYL) injection 12.5 mg  12.5 mg Intravenous Q6H PRN         [Auto Hold] benzocaine-menthol  (CHLORASEPTIC) 6-10 MG lozenge 1-2 lozenge  1-2 lozenge Buccal Q1H PRN   2 lozenge at 12/02/17 1937     [Auto Hold] naloxone (NARCAN) injection 0.1-0.4 mg  0.1-0.4 mg Intravenous Q2 Min PRN         [Auto Hold] acetaminophen (TYLENOL) tablet 650 mg  650 mg Oral Q4H PRN   650 mg at 12/03/17 2028     [Auto Hold] acetaminophen (TYLENOL) Suppository 650 mg  650 mg Rectal Q4H PRN         [Auto Hold] oxyCODONE IR (ROXICODONE) tablet 5-10 mg  5-10 mg Oral Q3H PRN   5 mg at 12/03/17 1148     [Auto Hold] HYDROmorphone (PF) (DILAUDID) injection 0.2 mg  0.2 mg Intravenous Q2H PRN   0.2 mg at 12/04/17 0805     [Auto Hold] senna-docusate (SENOKOT-S;PERICOLACE) 8.6-50 MG per tablet 1 tablet  1 tablet Oral BID PRN        Or     [Auto Hold] senna-docusate (SENOKOT-S;PERICOLACE) 8.6-50 MG per tablet 2 tablet  2 tablet Oral BID PRN         [Auto Hold] polyethylene glycol (MIRALAX/GLYCOLAX) Packet 17 g  17 g Oral Daily PRN         [Auto Hold] bisacodyl (DULCOLAX) Suppository 10 mg  10 mg Rectal Daily PRN         [Auto Hold] ondansetron (ZOFRAN-ODT) ODT tab 4 mg  4 mg Oral Q6H PRN   4 mg at 12/03/17 1152    Or     [Auto Hold] ondansetron (ZOFRAN) injection 4 mg  4 mg Intravenous Q6H PRN   4 mg at 12/03/17 1954     [Auto Hold] prochlorperazine (COMPAZINE) injection 5 mg  5 mg Intravenous Q6H PRN   5 mg at 12/03/17 2347    Or     [Auto Hold] prochlorperazine (COMPAZINE) tablet 5 mg  5 mg Oral Q6H PRN        Or     [Auto Hold] prochlorperazine (COMPAZINE) Suppository 12.5 mg  12.5 mg Rectal Q12H PRN         [Auto Hold] glucose 40 % gel 15-30 g  15-30 g Oral Q15 Min PRN        Or     [Auto Hold] dextrose 50 % injection 25-50 mL  25-50 mL Intravenous Q15 Min PRN        Or     [Auto Hold] glucagon injection 1 mg  1 mg Subcutaneous Q15 Min PRN         [Auto Hold] insulin aspart (NovoLOG) inj (RAPID ACTING)  1-7 Units Subcutaneous TID AC   2 Units at 12/03/17 1740     [Auto Hold] insulin aspart (NovoLOG) inj (RAPID ACTING)  1-5 Units  Subcutaneous At Bedtime   1 Units at 12/03/17 2209     influenza Vac Split High-Dose (FLUZONE) injection 0.5 mL  0.5 mL Intramuscular Prior to discharge         [Auto Hold] lisinopril (PRINIVIL/Zestril) tablet 40 mg  40 mg Oral Daily   40 mg at 12/03/17 0829     [Auto Hold] carvedilol (COREG) tablet 25 mg  25 mg Oral BID   25 mg at 12/04/17 0838     [Auto Hold] FLUoxetine (PROzac) capsule 20 mg  20 mg Oral Daily   20 mg at 12/03/17 0829     [Auto Hold] levothyroxine (SYNTHROID/LEVOTHROID) tablet 175 mcg  175 mcg Oral Daily   175 mcg at 12/03/17 0829     [Auto Hold] NIFEdipine ER osmotic (PROCARDIA XL) 24 hr tablet 90 mg  90 mg Oral Daily   90 mg at 12/04/17 0838     [Auto Hold] cloNIDine (CATAPRES) tablet 0.1 mg  0.1 mg Oral BID   0.1 mg at 12/03/17 2028     [Auto Hold] hydrALAZINE (APRESOLINE) injection 10 mg  10 mg Intravenous Q4H PRN         No current [facility]-ordered outpatient prescriptions on file.     Wt Readings from Last 1 Encounters:   12/03/17 109 kg (240 lb 4.8 oz)     Temp Readings from Last 1 Encounters:   12/04/17 36.5  C (97.7  F) (Oral)     BP Readings from Last 6 Encounters:   12/04/17 128/65   05/15/16 158/72   04/29/13 145/72   03/14/13 150/80   02/25/13 130/80   02/18/13 140/84     Pulse Readings from Last 4 Encounters:   12/04/17 92   05/13/16 81   04/29/13 61   03/14/13 64     Resp Readings from Last 1 Encounters:   12/04/17 14     SpO2 Readings from Last 1 Encounters:   12/04/17 94%     Recent Labs   Lab Test  12/04/17   0818  12/03/17   0728   NA  139  136   POTASSIUM  3.5  3.9   CHLORIDE  102  101   CO2  29  28   ANIONGAP  8  7   GLC  139*  173*   BUN  29  25   CR  2.20*  2.10*   CHAIM  8.3*  8.1*     Recent Labs   Lab Test  12/04/17   0818  12/03/17   0728   WBC  9.9  14.5*   HGB  9.5*  9.3*   PLT  304  324     No results for input(s): INR in the last 92101 hours.    Invalid input(s): APTT   RECENT LABS:   ECG:   ECHO:   CXR:                      .

## 2017-12-05 ENCOUNTER — APPOINTMENT (OUTPATIENT)
Dept: OCCUPATIONAL THERAPY | Facility: CLINIC | Age: 72
DRG: 029 | End: 2017-12-05
Attending: PHYSICIAN ASSISTANT
Payer: MEDICARE

## 2017-12-05 ENCOUNTER — APPOINTMENT (OUTPATIENT)
Dept: PHYSICAL THERAPY | Facility: CLINIC | Age: 72
DRG: 029 | End: 2017-12-05
Attending: PHYSICIAN ASSISTANT
Payer: MEDICARE

## 2017-12-05 LAB
CREAT SERPL-MCNC: 1.71 MG/DL (ref 0.52–1.04)
GFR SERPL CREATININE-BSD FRML MDRD: 29 ML/MIN/1.7M2
GLUCOSE BLDC GLUCOMTR-MCNC: 131 MG/DL (ref 70–99)
GLUCOSE BLDC GLUCOMTR-MCNC: 138 MG/DL (ref 70–99)
GLUCOSE BLDC GLUCOMTR-MCNC: 148 MG/DL (ref 70–99)
GLUCOSE BLDC GLUCOMTR-MCNC: 148 MG/DL (ref 70–99)
GLUCOSE BLDC GLUCOMTR-MCNC: 153 MG/DL (ref 70–99)
GLUCOSE BLDC GLUCOMTR-MCNC: 268 MG/DL (ref 70–99)
INTERPRETATION ECG - MUSE: NORMAL

## 2017-12-05 PROCEDURE — 97161 PT EVAL LOW COMPLEX 20 MIN: CPT | Mod: GP

## 2017-12-05 PROCEDURE — 99207 ZZC CDG-MDM COMPONENT: MEETS MODERATE - UP CODED: CPT | Performed by: HOSPITALIST

## 2017-12-05 PROCEDURE — 40000193 ZZH STATISTIC PT WARD VISIT

## 2017-12-05 PROCEDURE — 97166 OT EVAL MOD COMPLEX 45 MIN: CPT | Mod: GO | Performed by: OCCUPATIONAL THERAPIST

## 2017-12-05 PROCEDURE — 12000000 ZZH R&B MED SURG/OB

## 2017-12-05 PROCEDURE — 25000132 ZZH RX MED GY IP 250 OP 250 PS 637: Mod: GY | Performed by: HOSPITALIST

## 2017-12-05 PROCEDURE — S0032 INJECTION, NAFCILLIN SODIUM: HCPCS | Performed by: INTERNAL MEDICINE

## 2017-12-05 PROCEDURE — 40000133 ZZH STATISTIC OT WARD VISIT: Performed by: OCCUPATIONAL THERAPIST

## 2017-12-05 PROCEDURE — A9270 NON-COVERED ITEM OR SERVICE: HCPCS | Mod: GY | Performed by: PHYSICIAN ASSISTANT

## 2017-12-05 PROCEDURE — A9270 NON-COVERED ITEM OR SERVICE: HCPCS | Mod: GY | Performed by: HOSPITALIST

## 2017-12-05 PROCEDURE — 00000146 ZZHCL STATISTIC GLUCOSE BY METER IP

## 2017-12-05 PROCEDURE — 36569 INSJ PICC 5 YR+ W/O IMAGING: CPT

## 2017-12-05 PROCEDURE — 25000132 ZZH RX MED GY IP 250 OP 250 PS 637: Mod: GY | Performed by: PHYSICIAN ASSISTANT

## 2017-12-05 PROCEDURE — 97530 THERAPEUTIC ACTIVITIES: CPT | Mod: GP

## 2017-12-05 PROCEDURE — 25000125 ZZHC RX 250: Performed by: INTERNAL MEDICINE

## 2017-12-05 PROCEDURE — 82565 ASSAY OF CREATININE: CPT | Performed by: PHYSICIAN ASSISTANT

## 2017-12-05 PROCEDURE — 97530 THERAPEUTIC ACTIVITIES: CPT | Mod: GO | Performed by: OCCUPATIONAL THERAPIST

## 2017-12-05 PROCEDURE — 99233 SBSQ HOSP IP/OBS HIGH 50: CPT | Performed by: HOSPITALIST

## 2017-12-05 PROCEDURE — 27210219 ZZH KIT SHRLOCK 5FR DBL LUM PWR P

## 2017-12-05 PROCEDURE — 25000128 H RX IP 250 OP 636: Performed by: PHYSICIAN ASSISTANT

## 2017-12-05 PROCEDURE — 40000257 ZZH STATISTIC CONSULT NO CHARGE VASC ACCESS

## 2017-12-05 PROCEDURE — 25000131 ZZH RX MED GY IP 250 OP 636 PS 637: Mod: GY | Performed by: INTERNAL MEDICINE

## 2017-12-05 PROCEDURE — 97116 GAIT TRAINING THERAPY: CPT | Mod: GP

## 2017-12-05 PROCEDURE — 36415 COLL VENOUS BLD VENIPUNCTURE: CPT | Performed by: PHYSICIAN ASSISTANT

## 2017-12-05 PROCEDURE — 97535 SELF CARE MNGMENT TRAINING: CPT | Mod: GO | Performed by: OCCUPATIONAL THERAPIST

## 2017-12-05 PROCEDURE — 25000132 ZZH RX MED GY IP 250 OP 250 PS 637: Mod: GY | Performed by: ORTHOPAEDIC SURGERY

## 2017-12-05 RX ORDER — LANOLIN ALCOHOL/MO/W.PET/CERES
3 CREAM (GRAM) TOPICAL
Status: DISCONTINUED | OUTPATIENT
Start: 2017-12-05 | End: 2017-12-07 | Stop reason: HOSPADM

## 2017-12-05 RX ADMIN — NAFCILLIN 2 G: 2 POWDER, FOR SOLUTION INTRAMUSCULAR; INTRAVENOUS at 19:39

## 2017-12-05 RX ADMIN — CLONIDINE HYDROCHLORIDE 0.1 MG: 0.1 TABLET ORAL at 09:13

## 2017-12-05 RX ADMIN — LISINOPRIL 40 MG: 40 TABLET ORAL at 09:12

## 2017-12-05 RX ADMIN — ACETAMINOPHEN 650 MG: 325 TABLET, FILM COATED ORAL at 21:16

## 2017-12-05 RX ADMIN — CLONIDINE HYDROCHLORIDE 0.1 MG: 0.1 TABLET ORAL at 21:17

## 2017-12-05 RX ADMIN — NAFCILLIN 2 G: 2 POWDER, FOR SOLUTION INTRAMUSCULAR; INTRAVENOUS at 07:06

## 2017-12-05 RX ADMIN — NAFCILLIN 2 G: 2 POWDER, FOR SOLUTION INTRAMUSCULAR; INTRAVENOUS at 11:23

## 2017-12-05 RX ADMIN — NAFCILLIN 2 G: 2 POWDER, FOR SOLUTION INTRAMUSCULAR; INTRAVENOUS at 03:55

## 2017-12-05 RX ADMIN — FLUOXETINE 20 MG: 20 CAPSULE ORAL at 09:13

## 2017-12-05 RX ADMIN — CARVEDILOL 25 MG: 25 TABLET, FILM COATED ORAL at 21:17

## 2017-12-05 RX ADMIN — NIFEDIPINE 90 MG: 90 TABLET, FILM COATED, EXTENDED RELEASE ORAL at 09:13

## 2017-12-05 RX ADMIN — MELATONIN 3 MG: 3 TAB ORAL at 21:18

## 2017-12-05 RX ADMIN — NAFCILLIN 2 G: 2 POWDER, FOR SOLUTION INTRAMUSCULAR; INTRAVENOUS at 16:06

## 2017-12-05 RX ADMIN — ACETAMINOPHEN 650 MG: 325 TABLET, FILM COATED ORAL at 09:13

## 2017-12-05 RX ADMIN — SENNOSIDES AND DOCUSATE SODIUM 1 TABLET: 8.6; 5 TABLET ORAL at 02:28

## 2017-12-05 RX ADMIN — SODIUM CHLORIDE: 9 INJECTION, SOLUTION INTRAVENOUS at 08:12

## 2017-12-05 RX ADMIN — OXYCODONE HYDROCHLORIDE 5 MG: 5 TABLET ORAL at 02:28

## 2017-12-05 RX ADMIN — LEVOTHYROXINE SODIUM 175 MCG: 150 TABLET ORAL at 09:13

## 2017-12-05 RX ADMIN — CARVEDILOL 25 MG: 25 TABLET, FILM COATED ORAL at 09:12

## 2017-12-05 RX ADMIN — INSULIN GLARGINE 10 UNITS: 100 INJECTION, SOLUTION SUBCUTANEOUS at 09:12

## 2017-12-05 RX ADMIN — ACETAMINOPHEN 650 MG: 325 TABLET, FILM COATED ORAL at 16:06

## 2017-12-05 RX ADMIN — Medication 2 LOZENGE: at 11:22

## 2017-12-05 ASSESSMENT — ACTIVITIES OF DAILY LIVING (ADL)
ADLS_ACUITY_SCORE: 9

## 2017-12-05 ASSESSMENT — VISUAL ACUITY
OU: NORMAL ACUITY
OU: NORMAL ACUITY

## 2017-12-05 NOTE — PLAN OF CARE
Problem: Patient Care Overview  Goal: Plan of Care/Patient Progress Review  PT: PT orders received, initial eval completed and treatment initiated. Patient lives with her  in a house with 2 stairs to enter and main floor living. Previously independent with all ADLs and mobility without a device. Pt has a FWW at home. Pt admitted with back due to an L3-4 epidural abscess. Now s/p excision and removal of abscess, revision of L4 laminectomy and L3-4 decompression. Follow-up I&D on 12/4/17.    Discharge Planner PT   Patient plan for discharge: home with   Current status: Instructed pt in spine precautions, log roll. Provided handout and education regarding proper posture, positioning and body mechanics with functional tasks. Pt currently requires SBA for transfers and gait x 150' with a FWW and LS corset donned.  Barriers to return to prior living situation: medical status with possible I&D on 12/7/17 if needed  Recommendations for discharge: home with  assist for household tasks, possible use of FWW  Rationale for recommendations: Pt is mobilizing well and anticipate pt will be able to return home at hospital disch.       Entered by: Mimi Shaw 12/05/2017 10:53 AM

## 2017-12-05 NOTE — PROGRESS NOTES
12/05/17 1334   Quick Adds   Type of Visit Initial Occupational Therapy Evaluation   Living Environment   Lives With spouse   Living Arrangements house  (split level)   Home Accessibility bed and bath on same level;stairs to enter home;grab bars present (bathtub)   Number of Stairs to Enter Home 2   Number of Stairs Within Home 8   Transportation Available family or friend will provide;car   Living Environment Comment walk in, grab bar, comfort toilet with sink and tub nearby   Self-Care   Dominant Hand right   Functional Level Prior   Ambulation 0-->independent   Transferring 0-->independent   Toileting 0-->independent   Bathing 0-->independent   Dressing 0-->independent   Eating 0-->independent   Communication 0-->understands/communicates without difficulty   Swallowing 0-->swallows foods/liquids without difficulty   Cognition 0 - no cognition issues reported   Fall history within last six months no   General Information   Onset of Illness/Injury or Date of Surgery - Date 12/01/17   Referring Physician Dejah Mesa PA-C   Patient/Family Goals Statement Return home   Additional Occupational Profile Info/Pertinent History of Current Problem Pt admitted with back due to an L3-4 epidural abscess. Now s/p excision and removal of abscess, revision of L4 laminectomy and L3-4 decompression. Follow-up I&D on 12/4/17.   Precautions/Limitations fall precautions;spinal precautions   Cognitive Status Examination   Orientation orientation to person, place and time   Level of Consciousness alert   Able to Follow Commands WNL/WFL   Personal Safety (Cognitive) WNL/WFL   Memory intact   Attention No deficits were identified   Organization/Problem Solving No deficits were identified   Executive Function No deficits were identified   Sensory Examination   Sensory Comments no numbness or tingling   Pain Assessment   Patient Currently in Pain Yes, see Vital Sign flowsheet  (3/10)   Range of Motion (ROM)   ROM Comment B UE WFL  "  Strength   Strength Comments B UE WFL   Mobility   Bed Mobility Comments required immediate intervention for safe log roll tech   Transfer Skills   Transfer Comments Required immediate intervention for safe tech with FWW   Transfer Skill: Bed to Chair/Chair to Bed   Level of [place]: Bed to Chair stand-by assist  (unsafe FWW positioning requiring intervention)   Toilet Transfer   Toilet Transfer Comments required immediate intervention for safe transfer tech   Lower Body Dressing   Level of [place]: Dress Lower Body moderate assist (50% patients effort)  (spouse to assist at home)   Grooming   Level of [place]: Grooming stand-by assist   Assistive Device (FWW)   Instrumental Activities of Daily Living (IADL)   Previous Responsibilities meal prep;housekeeping;medication management;laundry;driving   Activities of Daily Living Analysis   Impairments Contributing to Impaired Activities of Daily Living balance impaired;pain;post surgical precautions   General Therapy Interventions   Planned Therapy Interventions ADL retraining;transfer training   Clinical Impression   Criteria for Skilled Therapeutic Interventions Met yes, treatment indicated   OT Diagnosis Decreased I and safety with ADL/IADLs    Influenced by the following impairments balance, pain, spinal precautions   Assessment of Occupational Performance 1-3 Performance Deficits   Identified Performance Deficits ADLs (dressing, bathing, toileting) all IADLs   Clinical Decision Making (Complexity) Low complexity   Predicted Duration of Therapy Intervention (days/wks) eval and treat only   Anticipated Discharge Disposition Home with Assist   Risks and Benefits of Treatment have been explained. Yes   Patient, Family & other staff in agreement with plan of care Yes   [facility] AM-PAC TM \"6 Clicks\"   2016, Trustees of [facility], under license to [employer].  All rights reserved.   6 Clicks Short Forms Daily Activity Inpatient Short " "Form   [facility] AM-PAC  \"6 Clicks\" Daily Activity Inpatient Short Form   1. Putting on and taking off regular lower body clothing? 2 - A Lot   2. Bathing (including washing, rinsing, drying)? 3 - A Little   3. Toileting, which includes using toilet, bedpan or urinal? 3 - A Little   4. Putting on and taking off regular upper body clothing? 4 - None   5. Taking care of personal grooming such as brushing teeth? 4 - None   6. Eating meals? 4 - None   Daily Activity Raw Score (Score out of 24.Lower scores equate to lower levels of function) 20   Total Evaluation Time   Total Evaluation Time (Minutes) 10     "

## 2017-12-05 NOTE — PLAN OF CARE
Problem: Patient Care Overview  Goal: Plan of Care/Patient Progress Review  Outcome: Improving  POD 1 I/D of lumbar incision. Alert and oriented x4. VSS on room air. CMS intact, strength 5/5 BLEs. Lung sounds clear. +BS, had lg BM. Holcomb patent with adequate o/p. Hemovac o/p 35 ml deep drain, 0 ml superficial. Ambulating with SBA. Minimal incision pain managed with prn tylenol. Tolerating mod carb diet. Plan for MRI and possible additional I/D on 12/7.

## 2017-12-05 NOTE — PROGRESS NOTES
" 12/05/17 1000   Quick Adds   Type of Visit Initial PT Evaluation   Living Environment   Lives With spouse   Living Arrangements house   Home Accessibility stairs to enter home   Number of Stairs to Enter Home 2   Number of Stairs Within Home 0   Stair Railings at Home outside, present on right side   Transportation Available car;family or friend will provide   Living Environment Comment Main floor living   Self-Care   Dominant Hand right   Usual Activity Tolerance good   Current Activity Tolerance moderate   Regular Exercise yes   Activity/Exercise Type walking   Exercise Amount/Frequency daily   Functional Level Prior   Ambulation 0-->independent   Transferring 0-->independent   Toileting 0-->independent   Bathing 0-->independent   Dressing 0-->independent   Eating 0-->independent   Communication 0-->understands/communicates without difficulty   Swallowing 0-->swallows foods/liquids without difficulty   Cognition 0 - no cognition issues reported   Prior Functional Level Comment Pt has a FWW at home   General Information   Onset of Illness/Injury or Date of Surgery - Date 12/01/17   Referring Physician Dejah Mesa, [place]   Patient/Family Goals Statement \"Go home\"   Pertinent History of Current Problem (include personal factors and/or comorbidities that impact the POC) 72 YOF presented with low back pain. Diagnosed with an epidural abscess L3-4 s/p excision and removal of abscess, revision of previous L3-4 decompression and L4 laminectomy. Follow-up I&D on 12/4/17. PMH: CKD3, IDDM, HTN, lumbar fusion 05/2016.   Precautions/Limitations fall precautions;spinal precautions  (brace OOB)   Weight-Bearing Status - LLE full weight-bearing   Weight-Bearing Status - RLE full weight-bearing   General Observations Pleasant and cooperative   General Info Comments Activity: up with assist   Cognitive Status Examination   Orientation orientation to person, place and time   Level of Consciousness alert   Follows Commands and " "Answers Questions 100% of the time;able to follow multistep instructions   Personal Safety and Judgment intact   Memory intact   Cognitive Comment no concerns   Pain Assessment   Patient Currently in Pain Yes, see Vital Sign flowsheet  (3/10 incision site)   Posture    Posture Forward head position;Protracted shoulders   Range of Motion (ROM)   ROM Comment BLEs WFL   Strength   Strength Comments BLEs grossly 4+/5 throughout   Bed Mobility   Bed Mobility Comments NT as pt was up in a chair   Transfer Skills   Transfer Comments Sit<>stand from chair to FWW with SBA   Gait   Gait Comments Gait with FWW x 5' with SBA, reciprocal pattern, good foot clearance B, decreased step length B   Balance   Balance Comments Good sitting, requires UE support for standing   Sensory Examination   Sensory Perception no deficits were identified   Coordination   Coordination no deficits were identified   General Therapy Interventions   Planned Therapy Interventions balance training;bed mobility training;gait training;strengthening;transfer training;other (see comments)   Intervention Comments posture and body mechanics education   Clinical Impression   Criteria for Skilled Therapeutic Intervention yes, treatment indicated   PT Diagnosis Impaired gait   Influenced by the following impairments pain, weakness, fatigue   Functional limitations due to impairments bed mobility, transfers, gait   Clinical Presentation Stable/Uncomplicated   Clinical Presentation Rationale progressing as expected   Clinical Decision Making (Complexity) Low complexity   Therapy Frequency` daily   Predicted Duration of Therapy Intervention (days/wks) 3 days   Anticipated Discharge Disposition Home with Assist   Risk & Benefits of therapy have been explained Yes   Patient, Family & other staff in agreement with plan of care Yes   [facility] AM-PAC TM \"6 Clicks\"   2016, Trustees of [facility], under license to [employer].  All rights reserved.   6 " "Clicks Short Forms Basic Mobility Inpatient Short Form   [facility] AM-PAC  \"6 Clicks\" V.2 Basic Mobility Inpatient Short Form   1. Turning from your back to your side while in a flat bed without using bedrails? 3 - A Little   2. Moving from lying on your back to sitting on the side of a flat bed without using bedrails? 3 - A Little   3. Moving to and from a bed to a chair (including a wheelchair)? 3 - A Little   4. Standing up from a chair using your arms (e.g., wheelchair, or bedside chair)? 3 - A Little   5. To walk in hospital room? 3 - A Little   6. Climbing 3-5 steps with a railing? 3 - A Little   Basic Mobility Raw Score (Score out of 24.Lower scores equate to lower levels of function) 18   Total Evaluation Time   Total Evaluation Time (Minutes) 10     "

## 2017-12-05 NOTE — PROGRESS NOTES
[facility]    Infectious Disease Progress Note    Date of Service (when I saw the patient): 12/05/2017     Assessment & Plan   Mary Baker is a 72 year old female who was admitted on 12/1/2017.     Impression:  1. 72 y.o female with history of Laminectomy and fusion in 2016.   2. Presenting this occasion with back pain with imaging consistent with epidural abscess. S/P Revision decompression, L3-4, with epidural abscess excision and removal, excision of bilateral synovial cysts L3-4, and revision laminotomy of L4.   3. CKD.    4. GPC in clusters in the cultures from the abscesses, further identified as MSSA, blood cultures NGTD so far.       Recommendations:   Continue Nafcillin, covers MSSA, no MRSA does not need any more vanco.   Will add Rifampin when ready for discharge given MSSA.   Planning 6 weeks - 8 weeks IV followed by some oral.   Will need picc.       Desi Perez MD    Interval History   MSSA in the cultures   Hardware in place but not involved per Dr. Jay     Physical Exam   Temp: 98.1  F (36.7  C) Temp src: Oral BP: 148/73   Heart Rate: 74 Resp: 18 SpO2: 95 % O2 Device: None (Room air) Oxygen Delivery: 2 LPM  Vitals:    12/02/17 0655 12/02/17 1710 12/03/17 0605   Weight: 104.9 kg (231 lb 4.2 oz) 104.9 kg (231 lb 4.2 oz) 109 kg (240 lb 4.8 oz)     Vital Signs with Ranges  Temp:  [97.8  F (36.6  C)-98.5  F (36.9  C)] 98.1  F (36.7  C)  Heart Rate:  [68-80] 74  Resp:  [10-24] 18  BP: (126-150)/(62-77) 148/73  SpO2:  [90 %-100 %] 95 %    Constitutional: Awake, alert, cooperative, no apparent distress  Lungs: Clear to auscultation bilaterally, no crackles or wheezing  Cardiovascular: Regular rate and rhythm, normal S1 and S2, and no murmur noted  Abdomen: Normal bowel sounds, soft, non-distended, non-tender  Skin: No rashes, no cyanosis, no edema  Other:    Medications     NaCl 75 mL/hr at 12/05/17 0812       nafcillin  2 g Intravenous Q4H     insulin glargine  10 Units Subcutaneous  QAM AC     sodium chloride (PF)  3 mL Intracatheter Q8H     insulin aspart  1-7 Units Subcutaneous TID AC     insulin aspart  1-5 Units Subcutaneous At Bedtime     influenza Vac Split High-Dose  0.5 mL Intramuscular Prior to discharge     lisinopril  40 mg Oral Daily     carvedilol (COREG) tablet 25 mg  25 mg Oral BID     FLUoxetine (PROzac) capsule 20 mg  20 mg Oral Daily     levothyroxine (SYNTHROID/LEVOTHROID) tablet 175 mcg  175 mcg Oral Daily     NIFEdipine ER osmotic  90 mg Oral Daily     cloNIDine (CATAPRES) tablet 0.1 mg  0.1 mg Oral BID       Data   All microbiology laboratory data reviewed.  Recent Labs   Lab Test  12/04/17   0818  12/03/17   0728  12/02/17   0719   WBC  9.9  14.5*  18.0*   HGB  9.5*  9.3*  11.0*   HCT  28.2*  27.6*  32.3*   MCV  86  87  86   PLT  304  324  342     Recent Labs   Lab Test  12/05/17   0806  12/04/17   0818  12/03/17   0728   CR  1.71*  2.20*  2.10*     Recent Labs   Lab Test  09/06/12   0917   SED  30     Recent Labs   Lab Test  12/02/17   1312  12/01/17   2025  12/01/17   2020  05/04/11   1326  04/25/11   0938  06/25/10   1016   CULT  Heavy growth  Staphylococcus aureus  *  Culture negative monitoring continues  No growth after 4 days  No growth after 4 days  <10,000 colonies/mL Staphylococcus aureus Plus 10 to 50,000 colonies/mL Multiple species present, probable perineal  contamination.  10 to 50,000 colonies/mL Multiple species present, probable perineal  contamination.  10 to 50,000 colonies/mL Multiple species present, probable perineal  contamination.

## 2017-12-05 NOTE — OP NOTE
PREOPERATIVE DIAGNOSES:     1.  Epidural abscess L3-4.   2.  Severe spinal stenosis L3-4.   3.  Status post previous excision and evacuation of epidural abscess.      POSTOPERATIVE DIAGNOSES:    1.  Epidural abscess L3-4.   2.  Severe spinal stenosis L3-4.   3.  Status post previous excision and evacuation of epidural abscess.      PROCEDURES:   1.  Re-exploration of L3-L4 epidural abscess.   2.  Additional debridement of epidural abscess with removal of synovial tissue and revision laminotomy of L3 and L4.      SURGEON:  Vishal Jay MD      ASSISTANT:  Dejah Mesa PA-C      ANESTHESIA:  General.      ESTIMATED BLOOD LOSS:  100 cc.      DRAINS:  Two deep drains and one superficial Hemovac drain.      INTRAOPERATIVE COMPLICATIONS:  None.       INDICATIONS FOR SURGERY:  The patient Mary Baker is a 72-year-old woman who underwent a decompression and posterior fusion in 05/2016.  She did well after that surgery and had no complaints of back pain or leg pain.  On 11/24/2017 she developed pain in the left gluteal area.  The pain worsened, and she was admitted to the hospital on 12/01/2017 with an epidural abscess.  She underwent evacuation of the epidural abscess on 12/03/2017 with the finding of jl purulent material in the epidural space.  Subsequent cultures are growing gram positive MSSA .  She is taken back to the operating room today for re-irrigation and debridement.  Postoperative MRI scan showed a possible synovial cyst on the right versus a residual epidural abscess.      DESCRIPTION OF PROCEDURE:  The patient was brought to the operating room on 12/04/2017 where a general anesthetic was administered.  A Holcomb catheter was in place.  She was positioned prone on the Trios table and was carefully padded and positioned.  The patient's back was prepped and draped in the routine sterile fashion.  Previously-placed staples and sutures in the skin were removed.  The 2-0 Vicryl sutures were cut and removed.   The subcutaneous layer was irrigated with a Pulsavac.  The fascial sutures were then removed, and the wound was explored.        There was an epidural hematoma found.  This was part of the complex fluid collection in the epidural space.  This was removed.  It was difficult to identify the thecal sac due to the synovial tissue and residual scar tissue that was on the dura.  A revision decompression was performed at L3 with an additional laminotomy performed.  This helped to identify the thecal sac more definitively.  The thecal sac was identified at the superior end of the lamina of L4.      After performing the laminotomy the decompression was carried over to the right side with removal of additional hypertrophic facet capsule.  We did find a synovial cyst in this area.  It was not infected, and   there was no pus or purulent material associated with it.  The synovial cyst was excised in total.  It was emanating from the facet joint on the right at L3-L4.      Meticulous removal of additional scar tissue from the dural sac allowed for more decompression of the thecal sac.  There were areas on the thecal sac where the scar tissue was relatively adherent to the dura, and we were not able to remove all the scar tissue.  I did not see sign of infection or purulent material during this re-exploration.      There was a lot of oozing from the bleeding cancellous surfaces of the bone.  This was treated with bone wax to achieve hemostasis.  There was additional bleeding from the scar tissue and muscle edges.  Electrocautery was used for hemostasis.  The wound was irrigated with 2 liters of saline and 1 liter of antibiotic solution using the Pulsavac lavage.  When I was done the wound area looked very clean.      Two Hemovac drains were placed deep under the fascia.  The fascia was closed with interrupted and running #1 Vicryl suture.  Another drain was placed in the subcutaneous layer.  The subcutaneous layer was closed with  2-0 Vicryl, and the skin was re-closed with staples.  Dressings were applied, drapes were removed, and the patient was transferred to the hospital cart and taken to the Recovery Room in good condition.         VISHAL PEREZ MD             D: 2017 07:01   T: 2017 07:52   MT: #[id number]      Name:     CAMILLE JENKINS   MRN:      [id number]        Account:        [id number]   :      1945           Procedure Date: 2017      Document: [id number]       cc: Vishal Gracia MD

## 2017-12-05 NOTE — PLAN OF CARE
Problem: Patient Care Overview  Goal: Plan of Care/Patient Progress Review  OT: Eval completed and treatment initiated. Pt admitted with back due to an L3-4 epidural abscess. Now s/p excision and removal of abscess, revision of L4 laminectomy and L3-4 decompression. Follow-up I&D on 12/4/17. Pt lives in split level home with spouse, walk in shower with grab bar, and RTS. Pt was previously I with all ADL/IADLs including driving and med management.   Discharge Planner OT   Patient plan for discharge: Home with assist  Current status: Pt completed toilet transfer onto RTS with FWW and SBA. Pt completed 1 grooming task at sink with FWW and SBA. Pt donned spinal brace with SBA sitting EOB. Pt declined LE dressing and will have  assist at home. Pt has dressing AE at home from previous surgery. Pt educated in shower DME and provided with handouts for future reference. No additional OT needs/concerns. Pt and spouse in agreement.   Barriers to return to prior living situation: pain, spinal precautions, stairs  Recommendations for discharge: Home with assist from spouse  Rationale for recommendations: Spouse to assist with dressing, bathing, and all IADLs. Pt functioning near baseline, has good support at home, and has been through a similar procedure 1.5 years ago with good knowledge of precautions and AE.        Entered by: Ang Richards 12/05/2017 2:19 PM   Occupational Therapy Discharge Summary    Reason for therapy discharge:    All goals and outcomes met, no further needs identified.    Progress towards therapy goal(s). See goals on Care Plan in [facility] electronic health record for goal details.  Goals met    Therapy recommendation(s):    No further therapy is recommended. Spouse to assist at home. See above for details

## 2017-12-05 NOTE — PLAN OF CARE
Problem: Patient Care Overview  Goal: Plan of Care/Patient Progress Review  Outcome: Improving  Pt is AxOx4, VSS on 2L O2 sating at 90-92%, denies pain, CMS intact, IPI has been 8-9 . 3 lumbar drainage tubes lead to 2 accordian style drains, marked- dressings CDI.  Back lami dressing CDI.  Tolerating CL diet, bowel sounds hypoactive. Pt not OOB with brace, but able to get OOB for bathroom.  IV replaced due to infiltration of left hand one while IV vessicant was running- RN stopped IV immediately with symptom of pain.  Nursing will continue to monitor.

## 2017-12-05 NOTE — PROGRESS NOTES
[facility]    Hospitalist Progress Note    Date of Service (when I saw the patient): 12/05/2017    Assessment & Plan   Ms. Baker is a markedly pleasant 72 year old woman with prior lumbar fusion and laminectomy as well as CKD Stage 3, Type 2 Diabetes mellitus, hypertension, dyslipidemia, hypothyroid and chronic anxiety who was admitted 12/2/2017 for lower back pain and found to have lumbar osteomyelitis and abscess.    1) Lumbar osteomyelitis and epidural abscess due to MSSA: Ms. Baker has lumbar fusion and laminectomy 5/2016. She presented here for progressive lower back pain radiating to the buttocks; ESR, CRP and WBC were all elevated at admission. MRI of the spine showed fluid collection at L3-4 as well as severe spinal stenosis and signs of cauda equina syndrome. Spine Surgery was consulted at admission. Vancomycin was started. ID was also consulted.     -- On 12/2 she underwent L4-S1 fusion with L3-4 synovial cyst excision; septic arthritis was found at the L3-4 facet joint.      -- On 12/4 she underwent re-exploration of L3-4 epidural abscess with additional debridement and removal of synovial tissue and revision laminotomy of L3 and L4      -- Wound cultures have grown MSSA; blood cultures have shown no growth to date. Antibiotics switched from Vancomycin to Nafcillin. Per ID, plan will be for 6-8 weeks of Nafcillin through a PICC to be placed today with Rifampin added at discharge      -- Per Spine Surgery further imaging and/or procedures may be needed by 12/7    2) TOMEKA on CKD 3: Baseline Creatinine appears to be 1-1.3; it was 1.86 at admission and since rajiv to 2.2.      -- Holding further Vancomycin as above      -- Hold Lisinopril today and going forward       -- Oral Lasix held at admission       -- Repeat BMP in AM    3) Type 2 Diabetes mellitus:A1c 6.7.       -- Holding Metformin       -- Lantus adjusted from 47 units Qam to 10 units Qam        -- Monitor further, may need slight  increase in AM    4) Hypothyroid: Continue Synthroid    5) Hypertension, Dyslipidemia: She takes Clonidine, Lasix, Benazepril, Carvedilol, and Pravastatin as an outpatient. Here, Lisinopril, Clonidine, Carvedilol, and Nifedipine were ordered after surgery.      -- Holding Lisinopril today as above. Holding Lasix       -- Continue Nifedipine for now      -- Continue Clonidine and Carvedilol        -- Resume statin at discharge    6) Chronic anxiety: She continues Fluoxetine    DVT Prophylaxis: Pneumatic Compression Devices  Code Status: Full Code    Disposition: Expected discharge in several days    Rian Lemos MD    Interval History   Seen and examined with family at the bedside. Feeling a lot better today. Bowels have not moved yet. Pain controlled. No other new complaints.    -Data reviewed today: I reviewed all new labs and imaging results over the last 24 hours. I personally reviewed no images or EKG's today.    Physical Exam   Temp: 98.2  F (36.8  C) Temp src: Oral BP: 140/71   Heart Rate: 76 Resp: 18 SpO2: 93 % O2 Device: Nasal cannula Oxygen Delivery: 1 LPM  Vitals:    12/02/17 0655 12/02/17 1710 12/03/17 0605   Weight: 104.9 kg (231 lb 4.2 oz) 104.9 kg (231 lb 4.2 oz) 109 kg (240 lb 4.8 oz)     Vital Signs with Ranges  Temp:  [97.8  F (36.6  C)-98.5  F (36.9  C)] 98.2  F (36.8  C)  Heart Rate:  [68-80] 76  Resp:  [10-24] 18  BP: (126-150)/(62-77) 140/71  SpO2:  [90 %-100 %] 93 %  I/O last 3 completed shifts:  In: 1773 [P.O.:300; I.V.:1473]  Out: 2425 [Urine:2275; Drains:50; Blood:100]    Constitutional: Alert and oriented to person, place and time; no apparent distress  HEENT: normocephalic moist mucus membranes  Respiratory: lungs clear to auscultation bilaterally  Cardiovascular: regular S1 S2 no murmurs rubs or gallops  GI: abdomen soft non tender non distended bowel sounds positive  Skin: no rash, good turgor  Musculoskeletal: no clubbing, cyanosis or edema  Neuro: EOMI; moves all four  extremities  Psych: Appropriate affect, insight and judgment      Medications     NaCl 75 mL/hr at 12/05/17 0812       sodium chloride (PF)  10 mL Intracatheter Q8H     nafcillin  2 g Intravenous Q4H     insulin glargine  10 Units Subcutaneous QAM AC     sodium chloride (PF)  3 mL Intracatheter Q8H     insulin aspart  1-7 Units Subcutaneous TID AC     insulin aspart  1-5 Units Subcutaneous At Bedtime     influenza Vac Split High-Dose  0.5 mL Intramuscular Prior to discharge     carvedilol (COREG) tablet 25 mg  25 mg Oral BID     FLUoxetine (PROzac) capsule 20 mg  20 mg Oral Daily     levothyroxine (SYNTHROID/LEVOTHROID) tablet 175 mcg  175 mcg Oral Daily     NIFEdipine ER osmotic  90 mg Oral Daily     cloNIDine (CATAPRES) tablet 0.1 mg  0.1 mg Oral BID       Data     Recent Labs  Lab 12/05/17  0806 12/04/17  0818 12/03/17  0728 12/02/17  0719   WBC  --  9.9 14.5* 18.0*   HGB  --  9.5* 9.3* 11.0*   MCV  --  86 87 86   PLT  --  304 324 342   NA  --  139 136 136   POTASSIUM  --  3.5 3.9 3.9   CHLORIDE  --  102 101 100   CO2  --  29 28 29   BUN  --  29 25 20   CR 1.71* 2.20* 2.10* 1.86*   ANIONGAP  --  8 7 7   CHAIM  --  8.3* 8.1* 8.9   GLC  --  139* 173* 244*       No results found for this or any previous visit (from the past 24 hour(s)).

## 2017-12-05 NOTE — PLAN OF CARE
Problem: Patient Care Overview  Goal: Plan of Care/Patient Progress Review  Pt is POD 1 of evacuation of abscess and decompression. Pt is A&O x 4. CMS intact except baseline neuropathy in BLE. Bowel sounds hypoactive. VSS. CAP WNL IPI between 8-9. On 3L O2. Dressing CDI. Pt had 30 ml in Hemovac 1 and 0 ml in Hemovac 2.Pt is up with assist of 1. C/o incisional pain 5/10 pain, decreased with oxycodone. Pt stated she only feels the pain when she moves or ambulates but otherwise feels pain free. Plan to have PT and OT.

## 2017-12-05 NOTE — PROGRESS NOTES
[facility]    Spine Surgery  Daily Post-Op Note    Assessment & Plan   Procedure(s):  IRRIGATION AND DEBRIDEMENT BACK   -1 Day Post-Op      ASSESSMENT:  Improved staau post evacuation epidural hematoma    PLAN:  Plan to discuss antibiotic therapy with infectious disease  Continue antibiotic, possible repeat I&D Thursday  Will repeat MRI scan on Wednesday if the scan looks better than possibly cancel Thursday I&D      Vishal Jay MD      Interval History   Doing well, no fevers chills or sweats.  Just incisional pain, stood and dangle last evening, no leg pains numbness or tingling, no nausea or headaches    Physical Exam   Temp: 98.3  F (36.8  C) Temp src: Oral BP: 150/77 Pulse: 92 Heart Rate: 74 Resp: 24 SpO2: 95 % O2 Device: Nasal cannula Oxygen Delivery: 2 LPM  Vitals:    12/02/17 0655 12/02/17 1710 12/03/17 0605   Weight: 104.9 kg (231 lb 4.2 oz) 104.9 kg (231 lb 4.2 oz) 109 kg (240 lb 4.8 oz)     Vital Signs with Ranges  Temp:  [97.7  F (36.5  C)-98.5  F (36.9  C)] 98.3  F (36.8  C)  Pulse:  [92] 92  Heart Rate:  [68-80] 74  Resp:  [10-24] 24  BP: (126-150)/(62-77) 150/77  SpO2:  [90 %-100 %] 95 %  I/O last 3 completed shifts:  In: 1773 [P.O.:300; I.V.:1473]  Out: 2425 [Urine:2275; Drains:50; Blood:100]    Alert, Oriented  Abd: S,NT,ND  Wound / Dressing: Clean, dry, and intact  CMS intact  Hemovac 40 deep drain and 10 superficial drain    Medications     NaCl 75 mL/hr at 12/03/17 1955        nafcillin  2 g Intravenous Q4H     insulin glargine  10 Units Subcutaneous QAM AC     sodium chloride (PF)  3 mL Intracatheter Q8H     insulin aspart  1-7 Units Subcutaneous TID AC     insulin aspart  1-5 Units Subcutaneous At Bedtime     influenza Vac Split High-Dose  0.5 mL Intramuscular Prior to discharge     lisinopril  40 mg Oral Daily     carvedilol (COREG) tablet 25 mg  25 mg Oral BID     FLUoxetine (PROzac) capsule 20 mg  20 mg Oral Daily     levothyroxine (SYNTHROID/LEVOTHROID) tablet 175 mcg   175 mcg Oral Daily     NIFEdipine ER osmotic  90 mg Oral Daily     cloNIDine (CATAPRES) tablet 0.1 mg  0.1 mg Oral BID       Data     Recent Labs  Lab 12/04/17  0818 12/03/17  0728 12/02/17  0719   HGB 9.5* 9.3* 11.0*       No results found for this or any previous visit (from the past 24 hour(s)).

## 2017-12-06 ENCOUNTER — HOME INFUSION (PRE-WILLOW HOME INFUSION) (OUTPATIENT)
Dept: PHARMACY | Facility: CLINIC | Age: 72
End: 2017-12-06

## 2017-12-06 ENCOUNTER — APPOINTMENT (OUTPATIENT)
Dept: PHYSICAL THERAPY | Facility: CLINIC | Age: 72
DRG: 029 | End: 2017-12-06
Attending: INTERNAL MEDICINE
Payer: MEDICARE

## 2017-12-06 ENCOUNTER — APPOINTMENT (OUTPATIENT)
Dept: MRI IMAGING | Facility: CLINIC | Age: 72
DRG: 029 | End: 2017-12-06
Attending: ORTHOPAEDIC SURGERY
Payer: MEDICARE

## 2017-12-06 LAB
ANION GAP SERPL CALCULATED.3IONS-SCNC: 7 MMOL/L (ref 3–14)
BUN SERPL-MCNC: 21 MG/DL (ref 7–30)
CALCIUM SERPL-MCNC: 8.2 MG/DL (ref 8.5–10.1)
CHLORIDE SERPL-SCNC: 107 MMOL/L (ref 94–109)
CO2 SERPL-SCNC: 28 MMOL/L (ref 20–32)
CREAT SERPL-MCNC: 1.48 MG/DL (ref 0.52–1.04)
ERYTHROCYTE [DISTWIDTH] IN BLOOD BY AUTOMATED COUNT: 13.3 % (ref 10–15)
GFR SERPL CREATININE-BSD FRML MDRD: 35 ML/MIN/1.7M2
GLUCOSE BLDC GLUCOMTR-MCNC: 108 MG/DL (ref 70–99)
GLUCOSE BLDC GLUCOMTR-MCNC: 125 MG/DL (ref 70–99)
GLUCOSE BLDC GLUCOMTR-MCNC: 150 MG/DL (ref 70–99)
GLUCOSE BLDC GLUCOMTR-MCNC: 154 MG/DL (ref 70–99)
GLUCOSE BLDC GLUCOMTR-MCNC: 219 MG/DL (ref 70–99)
GLUCOSE SERPL-MCNC: 165 MG/DL (ref 70–99)
HCT VFR BLD AUTO: 26.8 % (ref 35–47)
HGB BLD-MCNC: 8.9 G/DL (ref 11.7–15.7)
MCH RBC QN AUTO: 29 PG (ref 26.5–33)
MCHC RBC AUTO-ENTMCNC: 33.2 G/DL (ref 31.5–36.5)
MCV RBC AUTO: 87 FL (ref 78–100)
PLATELET # BLD AUTO: 446 10E9/L (ref 150–450)
POTASSIUM SERPL-SCNC: 3.3 MMOL/L (ref 3.4–5.3)
RBC # BLD AUTO: 3.07 10E12/L (ref 3.8–5.2)
SODIUM SERPL-SCNC: 142 MMOL/L (ref 133–144)
WBC # BLD AUTO: 7.2 10E9/L (ref 4–11)

## 2017-12-06 PROCEDURE — 25000132 ZZH RX MED GY IP 250 OP 250 PS 637: Mod: GY | Performed by: HOSPITALIST

## 2017-12-06 PROCEDURE — 25000131 ZZH RX MED GY IP 250 OP 636 PS 637: Mod: GY | Performed by: INTERNAL MEDICINE

## 2017-12-06 PROCEDURE — 40000239 ZZH STATISTIC VAT ROUNDS

## 2017-12-06 PROCEDURE — 97530 THERAPEUTIC ACTIVITIES: CPT | Mod: GP

## 2017-12-06 PROCEDURE — 00000146 ZZHCL STATISTIC GLUCOSE BY METER IP

## 2017-12-06 PROCEDURE — 25000128 H RX IP 250 OP 636: Performed by: PHYSICIAN ASSISTANT

## 2017-12-06 PROCEDURE — A9270 NON-COVERED ITEM OR SERVICE: HCPCS | Mod: GY | Performed by: HOSPITALIST

## 2017-12-06 PROCEDURE — 97116 GAIT TRAINING THERAPY: CPT | Mod: GP

## 2017-12-06 PROCEDURE — S0032 INJECTION, NAFCILLIN SODIUM: HCPCS | Performed by: INTERNAL MEDICINE

## 2017-12-06 PROCEDURE — A9270 NON-COVERED ITEM OR SERVICE: HCPCS | Mod: GY | Performed by: PHYSICIAN ASSISTANT

## 2017-12-06 PROCEDURE — 40000193 ZZH STATISTIC PT WARD VISIT

## 2017-12-06 PROCEDURE — 25000131 ZZH RX MED GY IP 250 OP 636 PS 637: Mod: GY | Performed by: HOSPITALIST

## 2017-12-06 PROCEDURE — 25000132 ZZH RX MED GY IP 250 OP 250 PS 637: Mod: GY | Performed by: PHYSICIAN ASSISTANT

## 2017-12-06 PROCEDURE — 25000125 ZZHC RX 250: Performed by: INTERNAL MEDICINE

## 2017-12-06 PROCEDURE — 85027 COMPLETE CBC AUTOMATED: CPT | Performed by: HOSPITALIST

## 2017-12-06 PROCEDURE — 99233 SBSQ HOSP IP/OBS HIGH 50: CPT | Performed by: HOSPITALIST

## 2017-12-06 PROCEDURE — 80048 BASIC METABOLIC PNL TOTAL CA: CPT | Performed by: HOSPITALIST

## 2017-12-06 PROCEDURE — 12000000 ZZH R&B MED SURG/OB

## 2017-12-06 PROCEDURE — 72148 MRI LUMBAR SPINE W/O DYE: CPT

## 2017-12-06 RX ORDER — NAFCILLIN SODIUM 2 G/1
2 INJECTION, POWDER, FOR SOLUTION INTRAVENOUS EVERY 4 HOURS
DISCHARGE
Start: 2017-12-06 | End: 2018-01-25

## 2017-12-06 RX ORDER — POTASSIUM CHLORIDE 1.5 G/1.58G
20 POWDER, FOR SOLUTION ORAL ONCE
Status: COMPLETED | OUTPATIENT
Start: 2017-12-06 | End: 2017-12-06

## 2017-12-06 RX ADMIN — NAFCILLIN 2 G: 2 POWDER, FOR SOLUTION INTRAMUSCULAR; INTRAVENOUS at 07:22

## 2017-12-06 RX ADMIN — SODIUM CHLORIDE: 9 INJECTION, SOLUTION INTRAVENOUS at 03:15

## 2017-12-06 RX ADMIN — NAFCILLIN 2 G: 2 POWDER, FOR SOLUTION INTRAMUSCULAR; INTRAVENOUS at 22:52

## 2017-12-06 RX ADMIN — INSULIN GLARGINE 10 UNITS: 100 INJECTION, SOLUTION SUBCUTANEOUS at 09:00

## 2017-12-06 RX ADMIN — LEVOTHYROXINE SODIUM 175 MCG: 150 TABLET ORAL at 08:45

## 2017-12-06 RX ADMIN — NAFCILLIN 2 G: 2 POWDER, FOR SOLUTION INTRAMUSCULAR; INTRAVENOUS at 00:05

## 2017-12-06 RX ADMIN — CLONIDINE HYDROCHLORIDE 0.1 MG: 0.1 TABLET ORAL at 20:10

## 2017-12-06 RX ADMIN — NIFEDIPINE 90 MG: 90 TABLET, FILM COATED, EXTENDED RELEASE ORAL at 08:45

## 2017-12-06 RX ADMIN — NAFCILLIN 2 G: 2 POWDER, FOR SOLUTION INTRAMUSCULAR; INTRAVENOUS at 15:13

## 2017-12-06 RX ADMIN — NAFCILLIN 2 G: 2 POWDER, FOR SOLUTION INTRAMUSCULAR; INTRAVENOUS at 03:13

## 2017-12-06 RX ADMIN — POTASSIUM CHLORIDE 20 MEQ: 1.5 POWDER, FOR SOLUTION ORAL at 12:15

## 2017-12-06 RX ADMIN — CARVEDILOL 25 MG: 25 TABLET, FILM COATED ORAL at 20:10

## 2017-12-06 RX ADMIN — NAFCILLIN 2 G: 2 POWDER, FOR SOLUTION INTRAMUSCULAR; INTRAVENOUS at 11:07

## 2017-12-06 RX ADMIN — INSULIN GLARGINE 5 UNITS: 100 INJECTION, SOLUTION SUBCUTANEOUS at 18:10

## 2017-12-06 RX ADMIN — NAFCILLIN 2 G: 2 POWDER, FOR SOLUTION INTRAMUSCULAR; INTRAVENOUS at 19:18

## 2017-12-06 RX ADMIN — HYDRALAZINE HYDROCHLORIDE 10 MG: 20 INJECTION INTRAMUSCULAR; INTRAVENOUS at 17:01

## 2017-12-06 RX ADMIN — CLONIDINE HYDROCHLORIDE 0.1 MG: 0.1 TABLET ORAL at 08:45

## 2017-12-06 RX ADMIN — FLUOXETINE 20 MG: 20 CAPSULE ORAL at 08:45

## 2017-12-06 RX ADMIN — CARVEDILOL 25 MG: 25 TABLET, FILM COATED ORAL at 08:45

## 2017-12-06 ASSESSMENT — ACTIVITIES OF DAILY LIVING (ADL)
ADLS_ACUITY_SCORE: 9

## 2017-12-06 NOTE — PROGRESS NOTES
[facility]    Infectious Disease Progress Note    Date of Service (when I saw the patient): 12/06/2017     Assessment & Plan   Mary Baker is a 72 year old female who was admitted on 12/1/2017.     Impression:  1. 72 y.o female with history of Laminectomy and fusion in 2016.   2. Presenting this occasion with back pain with imaging consistent with epidural abscess. S/P Revision decompression, L3-4, with epidural abscess excision and removal, excision of bilateral synovial cysts L3-4, and revision laminotomy of L4.   3. CKD.    4. GPC in clusters in the cultures from the abscesses, further identified as MSSA, blood cultures NGTD so far.       Recommendations:   Continue Nafcillin, covers MSSA, no MRSA does not need any more vanco. Orders in the D/C navigator, appreciate C/C helping.   Will add Rifampin when ready for discharge given MSSA.   Planning 6 weeks - 8 weeks IV followed by some oral.         Desi Perez MD    Interval History   MSSA in the cultures   Hardware in place but not involved per Dr. Jay     Physical Exam   Temp: 97.8  F (36.6  C) Temp src: Oral BP: 169/84 Pulse: 76 Heart Rate: 72 Resp: 16 SpO2: 93 % O2 Device: None (Room air)    Vitals:    12/02/17 0655 12/02/17 1710 12/03/17 0605   Weight: 104.9 kg (231 lb 4.2 oz) 104.9 kg (231 lb 4.2 oz) 109 kg (240 lb 4.8 oz)     Vital Signs with Ranges  Temp:  [97.8  F (36.6  C)-98.6  F (37  C)] 97.8  F (36.6  C)  Pulse:  [76] 76  Heart Rate:  [72-77] 72  Resp:  [16] 16  BP: (156-174)/(77-90) 169/84  SpO2:  [93 %-97 %] 93 %    Constitutional: Awake, alert, cooperative, no apparent distress  Lungs: Clear to auscultation bilaterally, no crackles or wheezing  Cardiovascular: Regular rate and rhythm, normal S1 and S2, and no murmur noted  Abdomen: Normal bowel sounds, soft, non-distended, non-tender  Skin: No rashes, no cyanosis, no edema  Other:    Medications     NaCl 75 mL/hr at 12/06/17 2637       potassium chloride  20 mEq Oral Once      sodium chloride (PF)  10 mL Intracatheter Q8H     nafcillin  2 g Intravenous Q4H     insulin glargine  10 Units Subcutaneous QAM AC     insulin aspart  1-7 Units Subcutaneous TID AC     insulin aspart  1-5 Units Subcutaneous At Bedtime     influenza Vac Split High-Dose  0.5 mL Intramuscular Prior to discharge     carvedilol (COREG) tablet 25 mg  25 mg Oral BID     FLUoxetine (PROzac) capsule 20 mg  20 mg Oral Daily     levothyroxine (SYNTHROID/LEVOTHROID) tablet 175 mcg  175 mcg Oral Daily     NIFEdipine ER osmotic  90 mg Oral Daily     cloNIDine (CATAPRES) tablet 0.1 mg  0.1 mg Oral BID       Data   All microbiology laboratory data reviewed.  Recent Labs   Lab Test  12/06/17   0730  12/04/17   0818  12/03/17   0728   WBC  7.2  9.9  14.5*   HGB  8.9*  9.5*  9.3*   HCT  26.8*  28.2*  27.6*   MCV  87  86  87   PLT  446  304  324     Recent Labs   Lab Test  12/06/17   0730  12/05/17   0806  12/04/17   0818   CR  1.48*  1.71*  2.20*     Recent Labs   Lab Test  09/06/12   0917   SED  30     Recent Labs   Lab Test  12/02/17   1312  12/01/17   2025  12/01/17   2020  05/04/11   1326  04/25/11   0938  06/25/10   1016   CULT  Heavy growth  Staphylococcus aureus  *  Culture negative monitoring continues  No growth after 5 days  No growth after 5 days  <10,000 colonies/mL Staphylococcus aureus Plus 10 to 50,000 colonies/mL Multiple species present, probable perineal  contamination.  10 to 50,000 colonies/mL Multiple species present, probable perineal  contamination.  10 to 50,000 colonies/mL Multiple species present, probable perineal  contamination.

## 2017-12-06 NOTE — PLAN OF CARE
Problem: Patient Care Overview  Goal: Plan of Care/Patient Progress Review  Outcome: Improving  POD#2 back I&D.  12/6 MRI showed debridement and no fluid collection, I&D scheduled for 12//7 d/c'd per Dr. Jay.  VSS except hypertension, did not meet parameters for PRN hydralazine >180.  Denies pain.  Holcomb discontinued, PVR 9ml.  Hemovac x2 discontinued and dressing change done to incision.  One time dose of potassium given for 3.3, recheck in AM.  CMS intact except baseline neuropathy BLE.  Ambulating with standby assist.  Tolerating diet, blood glucose coverage as ordered.  Pt deciding between home with IV abx vs. TCU.

## 2017-12-06 NOTE — PROGRESS NOTES
SP[facility] Progress Note  [facility]    Visited pt per length of stay. Pt declined visit, stating that she wanted to nap. Pt went on to explain that she just received word that she was hoping to discharge home tomorrow but just found out that she would be discharging to a nursing home or care facility so that she could receive IV antibiotics for 2 months. Pt feels dismayed at the idea of being in a nursing home over the holidays and stated that the holidays are a time for gathering at home with family. Pt feels overwhelmed by this news and stated that she just wants to sleep on it. SH offered prayer or a chance to talk further but pt declined at this time. SH will attempt to follow up with pt prior to discharge and is available if any specific needs arise.      Misti Major  Chaplain Resident  Pager: [phone]  Office: [phone]

## 2017-12-06 NOTE — PLAN OF CARE
Problem: Patient Care Overview  Goal: Plan of Care/Patient Progress Review  Pt is POD 2 of evacuation of abscess and decompression. Pt is A&O x 4. CMS intact except baseline neuropathy in BLE. Bowel sounds active and Pt is passing gas. VSS.  Hemovac deep Hemovac had 15 out and 5 out of curtis superficial  Hemovac.Pt is up with assist of 1. Pt has minimal pain and using tylenol for management. Pt had MRI this morning of her back and have consult with Dr. Jay about further surgery.

## 2017-12-06 NOTE — PROGRESS NOTES
Therapy: IV abx  Insurance: [employer]- [employer]  No IV abx coverage in the home, pt must be homebound for nursing coverage or would cost $332.50 per visit.     In reference to admission on 12/1/17 to check IV abx coverage.     Please contact Intake with any questions, [phone] or In Basket pool, [facility] ([phone]).

## 2017-12-06 NOTE — PROGRESS NOTES
[facility]    Hospitalist Progress Note    Date of Service (when I saw the patient): 12/06/2017    Assessment & Plan   Ms. Baker is a markedly pleasant 72 year old woman with prior lumbar fusion and laminectomy as well as CKD Stage 3, Type 2 Diabetes mellitus, hypertension, dyslipidemia, hypothyroid and chronic anxiety who was admitted 12/2/2017 for lower back pain and found to have lumbar osteomyelitis and abscess.    1) Lumbar osteomyelitis and epidural abscess due to MSSA: Ms. Baker has lumbar fusion and laminectomy 5/2016. She presented here for progressive lower back pain radiating to the buttocks; ESR, CRP and WBC were all elevated at admission. MRI of the spine showed fluid collection at L3-4 as well as severe spinal stenosis and signs of cauda equina syndrome. Spine Surgery was consulted at admission. Vancomycin was started. ID was also consulted.     -- On 12/2 she underwent L4-S1 fusion with L3-4 synovial cyst excision; septic arthritis was found at the L3-4 facet joint.      -- On 12/4 she underwent re-exploration of L3-4 epidural abscess with additional debridement and removal of synovial tissue and revision laminotomy of L3 and L4      -- Wound cultures have grown MSSA; blood cultures have shown no growth to date. Antibiotics switched from Vancomycin to Nafcillin. Per ID, plan will be for 6-8 weeks of Ancef 2 grams q 8 hours through a PICC placed 12/5, with Rifampin 300 mg BID added at discharge. I have discussed her case today with Dr. Perez of ID.      -- MRI repeated 12/6 and shows apparent resolution of fluid collection    2) TOMEKA on CKD 3: Baseline Creatinine appears to be 1-1.3; it was 1.86 at admission and since rajiv to 2.2.      -- Holding further Vancomycin as above      -- Held Lisinopril 12/5 and going forward       -- Oral Lasix held at admission        -- Creatinine improved today to 1.48       -- Repeat BMP in AM    3) Type 2 Diabetes mellitus:A1c 6.7.       -- Holding  Metformin       -- Lantus adjusted from 47 units Qam to 10 units Qam        -- Monitor further, will increase to 15 units today    4) Hypothyroid: Continue Synthroid    5) Hypertension, Dyslipidemia: She takes Clonidine, Lasix, Benazepril, Carvedilol, and Pravastatin as an outpatient. Here, Lisinopril, Clonidine, Carvedilol, and Nifedipine were ordered after surgery.      -- Holding Lisinopril today as above. Holding Lasix       -- Continue Nifedipine for now      -- Continue Clonidine and Carvedilol        -- Resume statin at discharge    6) Chronic anxiety: She continues Fluoxetine    7) Hypokalemia: Replaced    DVT Prophylaxis: Pneumatic Compression Devices  Code Status: Full Code    Disposition: Expected discharge now pending outpatient antibiotic coverage vs TCU    Rian Lemos MD    Interval History   Seen and examined with  at the bedside. Continues to feel better. Drains are all out. No other new complaints.    -Data reviewed today: I reviewed all new labs and imaging results over the last 24 hours. I personally reviewed no images or EKG's today.    Physical Exam   Temp: 97.8  F (36.6  C) Temp src: Oral BP: 169/84 Pulse: 76 Heart Rate: 72 Resp: 16 SpO2: 93 % O2 Device: None (Room air) Oxygen Delivery: 1 LPM  Vitals:    12/02/17 0655 12/02/17 1710 12/03/17 0605   Weight: 104.9 kg (231 lb 4.2 oz) 104.9 kg (231 lb 4.2 oz) 109 kg (240 lb 4.8 oz)     Vital Signs with Ranges  Temp:  [97.8  F (36.6  C)-98.6  F (37  C)] 97.8  F (36.6  C)  Pulse:  [76] 76  Heart Rate:  [72-77] 72  Resp:  [16] 16  BP: (156-174)/(77-90) 169/84  SpO2:  [93 %-97 %] 93 %  I/O last 3 completed shifts:  In: 3434 [P.O.:560; I.V.:2874]  Out: 3075 [Urine:3000; Drains:75]    Constitutional: Alert and oriented to person, place and time; no apparent distress  HEENT: normocephalic moist mucus membranes  Respiratory: lungs clear to auscultation bilaterally  Cardiovascular: regular S1 S2 3/6 kayy at upper sternum  GI: abdomen soft non  tender non distended bowel sounds positive  Skin: no rash, good turgor  Musculoskeletal: no clubbing, cyanosis or edema  Neuro: EOMI; moves all four extremities  Psych: Appropriate affect, insight and judgment      Medications     NaCl 75 mL/hr at 12/06/17 0315       potassium chloride  20 mEq Oral Once     sodium chloride (PF)  10 mL Intracatheter Q8H     nafcillin  2 g Intravenous Q4H     insulin glargine  10 Units Subcutaneous QAM AC     insulin aspart  1-7 Units Subcutaneous TID AC     insulin aspart  1-5 Units Subcutaneous At Bedtime     influenza Vac Split High-Dose  0.5 mL Intramuscular Prior to discharge     carvedilol (COREG) tablet 25 mg  25 mg Oral BID     FLUoxetine (PROzac) capsule 20 mg  20 mg Oral Daily     levothyroxine (SYNTHROID/LEVOTHROID) tablet 175 mcg  175 mcg Oral Daily     NIFEdipine ER osmotic  90 mg Oral Daily     cloNIDine (CATAPRES) tablet 0.1 mg  0.1 mg Oral BID       Data     Recent Labs  Lab 12/06/17  0730 12/05/17  0806 12/04/17  0818 12/03/17  0728   WBC 7.2  --  9.9 14.5*   HGB 8.9*  --  9.5* 9.3*   MCV 87  --  86 87     --  304 324     --  139 136   POTASSIUM 3.3*  --  3.5 3.9   CHLORIDE 107  --  102 101   CO2 28  --  29 28   BUN 21  --  29 25   CR 1.48* 1.71* 2.20* 2.10*   ANIONGAP 7  --  8 7   CHAIM 8.2*  --  8.3* 8.1*   *  --  139* 173*       Recent Results (from the past 24 hour(s))   MR Lumbar Spine w/o Contrast    Addendum: 12/6/2017    CAMILLE JENKINS  Accession # [id number]    The original report on this patient was dictated by me. I reviewed  these images with Dr. Jay. He stated that at the time of surgery on  12/4/2017, the soft tissue area in the posterior epidural space and  surgical bed did not demonstrate any abscess, hematoma, or drainable  fluid collection. Rather, it had the appearance of inflammation and  fibrosis. Since this area has a similar appearance on today's MRI when  compared to 12/3/2017, I suspect this is the same process with  no  current abscess, hematoma, or drainable fluid collection. In addition,  the previous MRI demonstrated a small synovial cyst arising from the  anterior medial aspect of the left L3-L4 facet joint. This is no  longer present on today's MRI. No additional change is seen.    ROLAN SAUL MD (Date of Addendum: 12/6/2017 8:22 AM)      ROLAN SAUL MD      Narrative    MRI LUMBAR SPINE WITHOUT CONTRAST December 6, 2017 6:47 AM     HISTORY: Follow up on decompression of epidural abscess. Surgery on  12/2/2017 and 12/4/2017.    COMPARISON: An MRI on 12/3/2017.    TECHNIQUE: Multiplanar MR imaging was performed without contrast.    FINDINGS: Again seen are surgical changes of posterior decompression  and fusion from L4 to S1 and posterior decompression at L3-L4. This is  unchanged. Again seen is a complex collection in the posterior  epidural space extending into the more posterior surgical bed at the  level of the L3-L4 disc. This is quite heterogeneous but predominantly  decreased in signal intensity on T1 and increased signal on the T2 and  STIR sequences. This is best seen on the sagittal series 701 image 8.  This again measures up to 4.2 cm craniocaudal and results in mass  effect upon the thecal sac with moderate central canal stenosis. The  overall appearance is quite similar to the previous examination. No  definite change is seen since the previous examination.      Impression    IMPRESSION: Despite surgery on 12/4/2017, I see no significant change  since an MRI on 12/3/2017. This includes surgical changes and complex  material in the posterior epidural space at L3-L4. This again could  represent either an epidural abscess or hematoma. This again results  in moderate central canal stenosis.    ROLAN SAUL MD

## 2017-12-06 NOTE — PLAN OF CARE
Problem: Patient Care Overview  Goal: Plan of Care/Patient Progress Review  Discharge Planner PT   Patient plan for discharge: home with   Current status: Pt demonstrates modified independence with bed mobility using log roll, transfers and gait x 500' with a FWW, up/down stairs with a single railing. Good awareness of spine precautions with all mobility.  Barriers to return to prior living situation: none  Recommendations for discharge: home with  assist for household tasks  Rationale for recommendations: Goals met       Entered by: Mimi Shaw 12/06/2017 10:37 AM     Physical Therapy Discharge Summary    Reason for therapy discharge:    Discharged to home.    Progress towards therapy goal(s). See goals on Care Plan in [facility] electronic health record for goal details.  Goals met    Therapy recommendation(s):    Continue home exercise program.  Ambulation for exercise

## 2017-12-06 NOTE — PROGRESS NOTES
[facility]    Spine Surgery  Daily Post-Op Note    Assessment & Plan   Procedure(s):  IRRIGATION AND DEBRIDEMENT BACK   -2 Days Post-Op      ASSESSMENT:  Improving status post L3-4 epidural abscess evacuation  MRIs showing adequate debridement and no collection of fluid      PLAN:  We will plan to remove Hemovac drains today  DC Holcomb catheter  Dressing change to the lumbar incision  Cancel tomorrow planned I&D   Plan for eight weeks of IV antibiotics per infectious disease    Vishal Jay MD      Interval History   Doing well.  He was up in a chair yesterday, minimal back pain and no leg symptoms, feels a little weak today.  No fevers or chills.  No nausea.  Had a bowel movement yesterday.  Brace is fitting well.    Physical Exam   Temp: 98.2  F (36.8  C) Temp src: Oral BP: 156/88 Pulse: 76 Heart Rate: 77 Resp: 16 SpO2: 96 % O2 Device: None (Room air) Oxygen Delivery: 1 LPM  Vitals:    12/02/17 0655 12/02/17 1710 12/03/17 0605   Weight: 104.9 kg (231 lb 4.2 oz) 104.9 kg (231 lb 4.2 oz) 109 kg (240 lb 4.8 oz)     Vital Signs with Ranges  Temp:  [98  F (36.7  C)-98.4  F (36.9  C)] 98.2  F (36.8  C)  Pulse:  [76] 76  Heart Rate:  [73-77] 77  Resp:  [16-18] 16  BP: (140-164)/(71-88) 156/88  SpO2:  [92 %-97 %] 96 %  I/O last 3 completed shifts:  In: 3434 [P.O.:560; I.V.:2874]  Out: 3075 [Urine:3000; Drains:75]    Alert, Oriented  Abd: S,NT,ND  Wound / Dressing: Clean, dry, and intact  Motor: 5/5 bilateral lower extremities throughout  Sensory: Normal sensory exam bilateral lower extremities throughout  Vascular: No edema in legs  Hemovac 10 mL Deep 5 mL superficial    Medications     NaCl 75 mL/hr at 12/06/17 0315        sodium chloride (PF)  10 mL Intracatheter Q8H     nafcillin  2 g Intravenous Q4H     insulin glargine  10 Units Subcutaneous QAM AC     sodium chloride (PF)  3 mL Intracatheter Q8H     insulin aspart  1-7 Units Subcutaneous TID AC     insulin aspart  1-5 Units Subcutaneous At  Bedtime     influenza Vac Split High-Dose  0.5 mL Intramuscular Prior to discharge     carvedilol (COREG) tablet 25 mg  25 mg Oral BID     FLUoxetine (PROzac) capsule 20 mg  20 mg Oral Daily     levothyroxine (SYNTHROID/LEVOTHROID) tablet 175 mcg  175 mcg Oral Daily     NIFEdipine ER osmotic  90 mg Oral Daily     cloNIDine (CATAPRES) tablet 0.1 mg  0.1 mg Oral BID       Data     Recent Labs  Lab 12/06/17  0730 12/04/17  0818 12/03/17  0728 12/02/17  0719   HGB 8.9* 9.5* 9.3* 11.0*       Recent Results (from the past 24 hour(s))   MR Lumbar Spine w/o Contrast    Narrative    MRI LUMBAR SPINE WITHOUT CONTRAST December 6, 2017 6:47 AM     HISTORY: Follow up on decompression of epidural abscess. Surgery on  12/2/2017 and 12/4/2017.    COMPARISON: An MRI on 12/3/2017.    TECHNIQUE: Multiplanar MR imaging was performed without contrast.    FINDINGS: Again seen are surgical changes of posterior decompression  and fusion from L4 to S1 and posterior decompression at L3-L4. This is  unchanged. Again seen is a complex collection in the posterior  epidural space extending into the more posterior surgical bed at the  level of the L3-L4 disc. This is quite heterogeneous but predominantly  decreased in signal intensity on T1 and increased signal on the T2 and  STIR sequences. This is best seen on the sagittal series 701 image 8.  This again measures up to 4.2 cm craniocaudal and results in mass  effect upon the thecal sac with moderate central canal stenosis. The  overall appearance is quite similar to the previous examination. No  definite change is seen since the previous examination.      Impression    IMPRESSION: Despite surgery on 12/4/2017, I see no significant change  since an MRI on 12/3/2017. This includes surgical changes and complex  material in the posterior epidural space at L3-L4. This again could  represent either an epidural abscess or hematoma. This again results  in moderate central canal stenosis.    ROLAN  MD DORYS

## 2017-12-06 NOTE — PROGRESS NOTES
Pt will need 6-8 weeks of IV abx coverage per Dr Perez/Pierre.   She has Medicare/BCBS so may not have great home IV abx coverage.  Not sure yet if there is an option for daily abx instead of Nafcillin.  Pt doesn't need another I&D tomorrow and she has a PICC line in place.  Therapies have seen her and cleared her to dc home.   Met w/ pt to discuss.  She states that her  required OP IV abx and went to a hospital near them to get them administered daily so she understands she may not have good home insurance coverage.  Offered choices for home infusion company to check benefits and she would like [facility].  Sent email referral to see what her coverage is.  Await results.      Addendum:  Pt doesn't have home IV abx coverage.  Paged Dr Perez to see if there is an alternate abx that only requires daily dosing.     Addendum:  Per Dr Perez pt doesn't have a daily abx that is appropriate.  She will dc home on Nafcillin q4 and oral rifampin. Per [facility] the cost is over $20,000 for 6 weeks. Discussed a couple of times with pt and .  Pt really doesn't want to dc to a rehab facility.  They are considering paying for home IV abx.  They wanted to know how the q4h abx would be administered and after checking with [facility], explained that it would be a pump that dispensed the dose q4h and only required a daily bag change.  They want to think about it more but pt really thinks she wants to go home.    Kriss at [facility] states she is out of their service area and would use [facility].  She will notify them that pt may be dc'ing tomorrow with infusion.  Pt will need to sign payment authorization before dc if decides yes. CC to send [facility] email confirmation if home abx needed tomorrow. Updated SW.

## 2017-12-07 ENCOUNTER — HOME INFUSION (PRE-WILLOW HOME INFUSION) (OUTPATIENT)
Dept: PHARMACY | Facility: CLINIC | Age: 72
End: 2017-12-07

## 2017-12-07 VITALS
OXYGEN SATURATION: 98 % | RESPIRATION RATE: 16 BRPM | BODY MASS INDEX: 35.59 KG/M2 | TEMPERATURE: 98 F | WEIGHT: 240.3 LBS | HEIGHT: 69 IN | HEART RATE: 76 BPM | DIASTOLIC BLOOD PRESSURE: 83 MMHG | SYSTOLIC BLOOD PRESSURE: 156 MMHG

## 2017-12-07 LAB
ANION GAP SERPL CALCULATED.3IONS-SCNC: 9 MMOL/L (ref 3–14)
BACTERIA SPEC CULT: NO GROWTH
BACTERIA SPEC CULT: NO GROWTH
BUN SERPL-MCNC: 14 MG/DL (ref 7–30)
CALCIUM SERPL-MCNC: 8.4 MG/DL (ref 8.5–10.1)
CHLORIDE SERPL-SCNC: 105 MMOL/L (ref 94–109)
CO2 SERPL-SCNC: 27 MMOL/L (ref 20–32)
CREAT SERPL-MCNC: 1.36 MG/DL (ref 0.52–1.04)
CRP SERPL-MCNC: 70.2 MG/L (ref 0–8)
ERYTHROCYTE [DISTWIDTH] IN BLOOD BY AUTOMATED COUNT: 13.3 % (ref 10–15)
ERYTHROCYTE [SEDIMENTATION RATE] IN BLOOD BY WESTERGREN METHOD: 116 MM/H (ref 0–30)
GFR SERPL CREATININE-BSD FRML MDRD: 38 ML/MIN/1.7M2
GLUCOSE BLDC GLUCOMTR-MCNC: 140 MG/DL (ref 70–99)
GLUCOSE BLDC GLUCOMTR-MCNC: 159 MG/DL (ref 70–99)
GLUCOSE BLDC GLUCOMTR-MCNC: 180 MG/DL (ref 70–99)
GLUCOSE BLDC GLUCOMTR-MCNC: 210 MG/DL (ref 70–99)
GLUCOSE SERPL-MCNC: 206 MG/DL (ref 70–99)
HCT VFR BLD AUTO: 27.9 % (ref 35–47)
HGB BLD-MCNC: 9.1 G/DL (ref 11.7–15.7)
MCH RBC QN AUTO: 28.3 PG (ref 26.5–33)
MCHC RBC AUTO-ENTMCNC: 32.6 G/DL (ref 31.5–36.5)
MCV RBC AUTO: 87 FL (ref 78–100)
PLATELET # BLD AUTO: 505 10E9/L (ref 150–450)
POTASSIUM SERPL-SCNC: 3.2 MMOL/L (ref 3.4–5.3)
RBC # BLD AUTO: 3.21 10E12/L (ref 3.8–5.2)
SODIUM SERPL-SCNC: 141 MMOL/L (ref 133–144)
SPECIMEN SOURCE: NORMAL
SPECIMEN SOURCE: NORMAL
WBC # BLD AUTO: 9.3 10E9/L (ref 4–11)

## 2017-12-07 PROCEDURE — 80048 BASIC METABOLIC PNL TOTAL CA: CPT | Performed by: HOSPITALIST

## 2017-12-07 PROCEDURE — 25000125 ZZHC RX 250: Performed by: INTERNAL MEDICINE

## 2017-12-07 PROCEDURE — 85027 COMPLETE CBC AUTOMATED: CPT | Performed by: HOSPITALIST

## 2017-12-07 PROCEDURE — 25000132 ZZH RX MED GY IP 250 OP 250 PS 637: Mod: GY | Performed by: HOSPITALIST

## 2017-12-07 PROCEDURE — S0032 INJECTION, NAFCILLIN SODIUM: HCPCS | Performed by: INTERNAL MEDICINE

## 2017-12-07 PROCEDURE — A9270 NON-COVERED ITEM OR SERVICE: HCPCS | Mod: GY | Performed by: HOSPITALIST

## 2017-12-07 PROCEDURE — 99239 HOSP IP/OBS DSCHRG MGMT >30: CPT | Performed by: HOSPITALIST

## 2017-12-07 PROCEDURE — 25000131 ZZH RX MED GY IP 250 OP 636 PS 637: Mod: GY | Performed by: HOSPITALIST

## 2017-12-07 PROCEDURE — 85652 RBC SED RATE AUTOMATED: CPT | Performed by: HOSPITALIST

## 2017-12-07 PROCEDURE — 25000132 ZZH RX MED GY IP 250 OP 250 PS 637: Mod: GY | Performed by: PHYSICIAN ASSISTANT

## 2017-12-07 PROCEDURE — 40000239 ZZH STATISTIC VAT ROUNDS

## 2017-12-07 PROCEDURE — A9270 NON-COVERED ITEM OR SERVICE: HCPCS | Mod: GY | Performed by: PHYSICIAN ASSISTANT

## 2017-12-07 PROCEDURE — 86140 C-REACTIVE PROTEIN: CPT | Performed by: HOSPITALIST

## 2017-12-07 PROCEDURE — 00000146 ZZHCL STATISTIC GLUCOSE BY METER IP

## 2017-12-07 RX ORDER — OXYCODONE HYDROCHLORIDE 5 MG/1
5-10 TABLET ORAL
Qty: 25 TABLET | Refills: 0 | Status: SHIPPED | OUTPATIENT
Start: 2017-12-07

## 2017-12-07 RX ORDER — RIFAMPIN 300 MG/1
300 CAPSULE ORAL 2 TIMES DAILY
Qty: 60 CAPSULE | Refills: 1 | Status: SHIPPED | OUTPATIENT
Start: 2017-12-07 | End: 2018-01-06

## 2017-12-07 RX ORDER — POTASSIUM CHLORIDE 1.5 G/1.58G
20 POWDER, FOR SOLUTION ORAL ONCE
Status: COMPLETED | OUTPATIENT
Start: 2017-12-07 | End: 2017-12-07

## 2017-12-07 RX ORDER — POTASSIUM CHLORIDE 1500 MG/1
20 TABLET, EXTENDED RELEASE ORAL DAILY
Qty: 30 TABLET | Refills: 0 | Status: SHIPPED | OUTPATIENT
Start: 2017-12-07 | End: 2018-01-06

## 2017-12-07 RX ADMIN — NAFCILLIN 2 G: 2 POWDER, FOR SOLUTION INTRAMUSCULAR; INTRAVENOUS at 12:22

## 2017-12-07 RX ADMIN — INSULIN GLARGINE 15 UNITS: 100 INJECTION, SOLUTION SUBCUTANEOUS at 08:17

## 2017-12-07 RX ADMIN — NIFEDIPINE 90 MG: 90 TABLET, FILM COATED, EXTENDED RELEASE ORAL at 08:17

## 2017-12-07 RX ADMIN — FLUOXETINE 20 MG: 20 CAPSULE ORAL at 08:17

## 2017-12-07 RX ADMIN — NAFCILLIN 2 G: 2 POWDER, FOR SOLUTION INTRAMUSCULAR; INTRAVENOUS at 03:33

## 2017-12-07 RX ADMIN — LEVOTHYROXINE SODIUM 175 MCG: 150 TABLET ORAL at 08:17

## 2017-12-07 RX ADMIN — CLONIDINE HYDROCHLORIDE 0.1 MG: 0.1 TABLET ORAL at 08:17

## 2017-12-07 RX ADMIN — CARVEDILOL 25 MG: 25 TABLET, FILM COATED ORAL at 08:17

## 2017-12-07 RX ADMIN — POTASSIUM CHLORIDE 20 MEQ: 1.5 POWDER, FOR SOLUTION ORAL at 10:26

## 2017-12-07 RX ADMIN — NAFCILLIN 2 G: 2 POWDER, FOR SOLUTION INTRAMUSCULAR; INTRAVENOUS at 08:07

## 2017-12-07 ASSESSMENT — ACTIVITIES OF DAILY LIVING (ADL)
ADLS_ACUITY_SCORE: 9

## 2017-12-07 NOTE — PLAN OF CARE
Problem: Patient Care Overview  Goal: Plan of Care/Patient Progress Review  Pt is POD 3 of I/D of L4 L5 decompression. Pt A&O. CMS intact. Bowel sounds active and LS clear. Pt's BP has been elevated through th NOC but has not required any medications. Pt believes that this is due to her anxiety about DC. Dressing is CDI.  Up with 1 GB/ W. Pt has denied pain through the night. Plan to DC to home with IV ABX.

## 2017-12-07 NOTE — PROGRESS NOTES
[facility]    Infectious Disease Progress Note    Date of Service (when I saw the patient): 12/07/2017     Assessment & Plan   Mary Baker is a 72 year old female who was admitted on 12/1/2017.     Impression:  1. 72 y.o female with history of Laminectomy and fusion in 2016.   2. Presenting this occasion with back pain with imaging consistent with epidural abscess. S/P Revision decompression, L3-4, with epidural abscess excision and removal, excision of bilateral synovial cysts L3-4, and revision laminotomy of L4.   3. CKD.    4. GPC in clusters in the cultures from the abscesses, further identified as MSSA, blood cultures NGTD so far.       Recommendations:   Continue Nafcillin, covers MSSA, no MRSA does not need any more vanco. Orders in the D/C navigator, appreciate C/C helping.   Will add Rifampin when ready for discharge given MSSA.   Planning 6 weeks - 8 weeks IV followed by some oral.         Desi Perez MD    Interval History   MSSA in the cultures   Hardware in place but not involved per Dr. Jay     Physical Exam   Temp: 97.6  F (36.4  C) Temp src: Oral BP: 152/78   Heart Rate: 79 Resp: 16 SpO2: 98 % O2 Device: None (Room air)    Vitals:    12/02/17 0655 12/02/17 1710 12/03/17 0605   Weight: 104.9 kg (231 lb 4.2 oz) 104.9 kg (231 lb 4.2 oz) 109 kg (240 lb 4.8 oz)     Vital Signs with Ranges  Temp:  [97.6  F (36.4  C)-99.2  F (37.3  C)] 97.6  F (36.4  C)  Heart Rate:  [71-83] 79  Resp:  [16] 16  BP: (152-188)/(78-99) 152/78  SpO2:  [93 %-98 %] 98 %    Constitutional: Awake, alert, cooperative, no apparent distress  Lungs: Clear to auscultation bilaterally, no crackles or wheezing  Cardiovascular: Regular rate and rhythm, normal S1 and S2, and no murmur noted  Abdomen: Normal bowel sounds, soft, non-distended, non-tender  Skin: No rashes, no cyanosis, no edema  Other:    Medications     NaCl 75 mL/hr at 12/06/17 0315       insulin glargine  15 Units Subcutaneous QAM AC     sodium  chloride (PF)  10 mL Intracatheter Q8H     nafcillin  2 g Intravenous Q4H     insulin aspart  1-7 Units Subcutaneous TID AC     insulin aspart  1-5 Units Subcutaneous At Bedtime     influenza Vac Split High-Dose  0.5 mL Intramuscular Prior to discharge     carvedilol (COREG) tablet 25 mg  25 mg Oral BID     FLUoxetine (PROzac) capsule 20 mg  20 mg Oral Daily     levothyroxine (SYNTHROID/LEVOTHROID) tablet 175 mcg  175 mcg Oral Daily     NIFEdipine ER osmotic  90 mg Oral Daily     cloNIDine (CATAPRES) tablet 0.1 mg  0.1 mg Oral BID       Data   All microbiology laboratory data reviewed.  Recent Labs   Lab Test  12/07/17   0805  12/06/17   0730  12/04/17   0818   WBC  9.3  7.2  9.9   HGB  9.1*  8.9*  9.5*   HCT  27.9*  26.8*  28.2*   MCV  87  87  86   PLT  505*  446  304     Recent Labs   Lab Test  12/07/17   0805  12/06/17   0730  12/05/17   0806   CR  1.36*  1.48*  1.71*     Recent Labs   Lab Test  12/07/17   0805   SED  116*     Recent Labs   Lab Test  12/02/17   1312  12/01/17   2025  12/01/17   2020  05/04/11   1326  04/25/11   0938  06/25/10   1016   CULT  Heavy growth  Staphylococcus aureus  *  Culture negative monitoring continues  No growth  No growth  <10,000 colonies/mL Staphylococcus aureus Plus 10 to 50,000 colonies/mL Multiple species present, probable perineal  contamination.  10 to 50,000 colonies/mL Multiple species present, probable perineal  contamination.  10 to 50,000 colonies/mL Multiple species present, probable perineal  contamination.

## 2017-12-07 NOTE — PROGRESS NOTES
[facility]    Spine Surgery  Daily Post-Op Note    Assessment & Plan   Procedure(s):  IRRIGATION AND DEBRIDEMENT BACK   -3 Days Post-Op      ASSESSMENT:  POD#5 initial I&D and evacuated of epidural abscess  POD#3 reexploration and I&D of epidural abscess    PLAN:  Plan for discharge to home with IV antibiotics anticipate eight weeks  Follow-up with me in two weeks for staple removal  Limited activity, wear brace when up.    Vishal Jay MD      Interval History   Doing well, not taking anything for pain for the last two days other than Tylenol for headache.  No leg symptoms no numbness or tingling no nausea or vomiting    Physical Exam   Temp: 98.5  F (36.9  C) Temp src: Oral BP: (!) 178/98   Heart Rate: 78 Resp: 16 SpO2: 95 % O2 Device: None (Room air)    Vitals:    12/02/17 0655 12/02/17 1710 12/03/17 0605   Weight: 104.9 kg (231 lb 4.2 oz) 104.9 kg (231 lb 4.2 oz) 109 kg (240 lb 4.8 oz)     Vital Signs with Ranges  Temp:  [97.8  F (36.6  C)-99.2  F (37.3  C)] 98.5  F (36.9  C)  Heart Rate:  [72-83] 78  Resp:  [16] 16  BP: (168-188)/(84-99) 178/98  SpO2:  [93 %-96 %] 95 %  I/O last 3 completed shifts:  In: 1110 [P.O.:720; I.V.:390]  Out: 3350 [Urine:3350]    Alert, Oriented  Abd: S,NT,ND  Wound / Dressing: Clean, dry, and intact staples intact no drainage    Medications     NaCl 75 mL/hr at 12/06/17 0315        insulin glargine  15 Units Subcutaneous QAM AC     sodium chloride (PF)  10 mL Intracatheter Q8H     nafcillin  2 g Intravenous Q4H     insulin aspart  1-7 Units Subcutaneous TID AC     insulin aspart  1-5 Units Subcutaneous At Bedtime     influenza Vac Split High-Dose  0.5 mL Intramuscular Prior to discharge     carvedilol (COREG) tablet 25 mg  25 mg Oral BID     FLUoxetine (PROzac) capsule 20 mg  20 mg Oral Daily     levothyroxine (SYNTHROID/LEVOTHROID) tablet 175 mcg  175 mcg Oral Daily     NIFEdipine ER osmotic  90 mg Oral Daily     cloNIDine (CATAPRES) tablet 0.1 mg  0.1 mg Oral  BID       Data     Recent Labs  Lab 12/06/17  0730 12/04/17  0818 12/03/17  0728 12/02/17  0719   HGB 8.9* 9.5* 9.3* 11.0*       No results found for this or any previous visit (from the past 24 hour(s)).

## 2017-12-07 NOTE — DISCHARGE SUMMARY
[facility]    Discharge Summary  Hospitalist    Date of Admission:  12/1/2017  Date of Discharge:  12/7/2017  Discharging Provider: Rian Lemos MD  Date of Service (when I saw the patient): 12/07/17    Discharge Diagnoses   1) Lumbar osteomyelitis and epidural abscess due to MSSA  2) TOMEKA on CKD 3    History of Present Illness   Ms. Baker is a markedly pleasant 72 year old woman with prior lumbar fusion and laminectomy as well as CKD Stage 3, Type 2 Diabetes mellitus, hypertension, dyslipidemia, hypothyroid and chronic anxiety who was admitted 12/2/2017 for lower back pain and found to have lumbar osteomyelitis and abscess. Please see the H and P for complete details of the initial presentation.     Hospital Course   Mary Baker was admitted on 12/1/2017.  The following problems were addressed during her hospitalization:    1) Lumbar osteomyelitis and epidural abscess due to MSSA: Ms. Baker had lumbar fusion and laminectomy 5/2016. She presented here for progressive lower back pain radiating to the buttocks; ESR, CRP and WBC were all elevated at admission. MRI of the spine showed fluid collection at L3-4 as well as severe spinal stenosis and signs of cauda equina syndrome. Spine Surgery was consulted at admission. Vancomycin was started. ID was also consulted.     -- On 12/2 she underwent L4-S1 fusion with L3-4 synovial cyst excision; septic arthritis was found at the L3-4 facet joint.      -- On 12/4 she underwent re-exploration of L3-4 epidural abscess with additional debridement and removal of synovial tissue and revision laminotomy of L3 and L4          -- MRI repeated 12/6 and shows apparent resolution of fluid collection        -- Wound cultures have grown MSSA; blood cultures have shown no growth to date. Antibiotics switched from Vancomycin to Nafcillin. Per ID, her plan will be for 6-8 weeks of Ancef 2 grams q 8 hours through a PICC placed 12/5, with Rifampin 300 mg BID added  "at discharge. She will follow up as scheduled with ID and with her PCP in one week    2) TOMEKA on CKD 3: Baseline Creatinine appears to be 1-1.3; it was 1.86 at admission and since rajiv to 2.2. Vancomycin, Lisinopril, and Lasix were held. This morning it has improved to 1.3. She will continue hold her Ace inhibiotr at discharge until repeat BMP in one week to make sure her renal function is stable. She may benefit from referral to Nephrology as an outpatient.     3) Type 2 Diabetes mellitus:A1c 6.7.       -- Metformin held at admission       -- Lantus adjusted from 47 units Qam to 15 units. For the past day her blood sugars have started to rise up. Finger stick this morning is over 200. It is likely the best plan for her to resume her outpatient regimen at discharge but she will be counseled to check her blood sugars at least twice a day and if found to be low, to take sugar and call her physician team     4) Hypothyroid: Continue Synthroid     5) Hypertension, Dyslipidemia: She takes Clonidine, Lasix, Benazepril, Carvedilol, and Pravastatin as an outpatient. Here, Lisinopril, Clonidine, Carvedilol, and Nifedipine were ordered after surgery. She will resume her outpatient regimen of medications including her statin at discharge     6) Chronic anxiety: She continues Fluoxetine     7) Hypokalemia: Replaced    Rian Lemos MD    Pending Results   These results will be followed up by ID    Unresulted Labs Ordered in the Past 30 Days of this Admission     Date and Time Order Name Status Description    12/2/2017 1314 Anaerobic bacterial culture Preliminary     12/2/2017 1314 Abscess Culture Aerobic Bacterial Preliminary           Code Status   Full Code       Primary Care Physician   Vishal Gracia    Blood pressure 156/83, pulse 76, temperature 98  F (36.7  C), temperature source Oral, resp. rate 16, height 1.753 m (5' 9\"), weight 109 kg (240 lb 4.8 oz), SpO2 98 %.    Constitutional: Alert and oriented to person, " place and time; no apparent distress  HEENT: normocephalic moist mucous membranes  Respiratory: lungs clear to auscultation bilaterally  Cardiovascular: regular S1 S2 no murmurs rubs or gallops  GI: abdomen soft non tender non distended bowel sounds positive  Lymph/Hematologic: no pallor, no cervical lymphadenopathy  Skin: no rash, good turgor  Musculoskeletal: no clubbing, cyanosis or edema  Neurologic: extra-ocular muscles intact; moves all four extremities  Psychiatric: appropriate affect, insight and judgment    Discharge Disposition   Discharged to home  Condition at discharge: Good    Consultations This Hospital Stay   PHYSICAL THERAPY ADULT IP CONSULT  OCCUPATIONAL THERAPY ADULT IP CONSULT  SPINE SURGERY ADULT IP CONSULT  PHARMACY TO DOSE VANCO  INFECTIOUS DISEASES IP CONSULT  ORTHOSIS SPINAL IP CONSULT  OCCUPATIONAL THERAPY ADULT IP CONSULT  PHYSICAL THERAPY ADULT IP CONSULT  VASCULAR ACCESS ADULT IP CONSULT    Time Spent on this Encounter   I, Rian Lemos, personally saw the patient today and spent greater than 30 minutes discharging this patient.    Discharge Orders     Home infusion referral     Activity   Your activity upon discharge: follow Dr. Jay d/c instruction sheet     Discharge Instructions     Supplies   List the supplies the pt needs to go home  4x4s and tape     Wound care and dressings   Instructions to care for your wound at home: change dressing daily, keep clean and dry, follow up with Dr. Jay in 2 weeks for staple removal, wear brace when up     MD face to face encounter   Documentation of Face to Face and Certification for Home Health Services    I certify that patient: Mary Baker is under my care and that I, or a nurse practitioner or physician's assistant working with me, had a face-to-face encounter that meets the physician face-to-face encounter requirements with this patient on: 12/7/2017.    This encounter with the patient was in whole, or in part, for the following  medical condition, which is the primary reason for home health care: Epidural abscess.    I certify that, based on my findings, the following services are medically necessary home health services: Nursing.    My clinical findings support the need for the above services because: Nurse is needed: To provide caregiver training to assist with: home infusions..    Further, I certify that my clinical findings support that this patient is homebound (i.e. absences from home require considerable and taxing effort and are for medical reasons or [employer] services or infrequently or of short duration when for other reasons) because: Requires assistance of another person or specialized equipment to access medical services because patient: Range of motion limitations prevents ability to exit home safely...    Based on the above findings. I certify that this patient is confined to the home and needs intermittent skilled nursing care, physical therapy and/or speech therapy.  The patient is under my care, and I have initiated the establishment of the plan of care.  This patient will be followed by a physician who will periodically review the plan of care.  Physician/Provider to provide follow up care: Vishal Gracia    Attending hospital physician (the Medicare certified [place] provider): Rian Lemos  Physician Signature: See electronic signature associated with these discharge orders.  Date: 12/7/2017     Reason for your hospital stay   For surgical treatment of epidural spine abscess     Follow Up and recommended labs and tests   Follow up with primary care provider, Vishal Gracia, within 7 days for hospital follow- up.  The following labs/tests are recommended: BMP.     Full Code     Diet   Follow this diet upon discharge: Orders Placed This Encounter     Moderate Consistent CHO Diet       Discharge Medications   Current Discharge Medication List      START taking these medications    Details   oxyCODONE IR (ROXICODONE) 5  MG tablet Take 1-2 tablets (5-10 mg) by mouth every 3 hours as needed for moderate to severe pain  Qty: 25 tablet, Refills: 0    Associated Diagnoses: Epidural abscess      Potassium Chloride ER 20 MEQ TBCR Take 1 tablet (20 mEq) by mouth daily  Qty: 30 tablet, Refills: 0    Associated Diagnoses: Hypokalemia      rifampin (RIFADIN) 300 MG capsule Take 1 capsule (300 mg) by mouth 2 times daily  Qty: 60 capsule, Refills: 1    Associated Diagnoses: Epidural abscess      Nafcillin Sodium 2 G SOLR Inject 2 g into the vein every 4 hours weekly CBC, creatinine and ALT faxed to [phone]    Associated Diagnoses: Epidural abscess         CONTINUE these medications which have NOT CHANGED    Details   !! CLONIDINE HCL PO Take 0.2 mg by mouth At Bedtime Patient takes 0.1mg in the afternoon and 0.2mg at bedtime      METFORMIN HCL PO Take 1,000 mg by mouth 2 times daily (with meals) takes 2 x 500mg tablets      Acetaminophen (TYLENOL PO) Take 1,500 mg by mouth daily as needed for mild pain or fever      LORAZEPAM PO Take 1 mg by mouth 2 times daily as needed       Omega-3 Fatty Acids (OMEGA-3 FISH OIL PO) Take 1 capsule by mouth twice a week      FLUOXETINE HCL PO Take 20 mg by mouth daily      FUROSEMIDE PO Take 40 mg by mouth daily (Takes 2 x 20mg tablet)      LEVOTHYROXINE SODIUM PO Take 175 mcg by mouth daily      !! CLONIDINE HCL PO Take 0.1 mg by mouth daily Patient takes 0.1mg in the afternoon and 0.2mg at bedtime      CARVEDILOL PO Take 25 mg by mouth 2 times daily In the Afternoon and at Bedtime      PRAVASTATIN SODIUM PO Take 40 mg by mouth every evening      insulin pen needle ([employer] SHORT PEN NEEDLES) 31G X 8 MM MISC Use BID  Qty: 100 each, Refills: 11    Associated Diagnoses: Diabetes mellitus, type 2 (H)      Glucose Blood (ACCU-CHEK SHANTEL) STRP Pt tests 4 times daily before meals and nightly  Qty: 450 strip, Refills: prn    Associated Diagnoses: Type II or unspecified type diabetes mellitus without mention of  complication, not stated as uncontrolled      LANTUS SOLOSTAR 100 UNIT/ML injection Inject 47 Units Subcutaneous every morning   Qty: 1 Month, Refills: 5    Associated Diagnoses: Type 2 diabetes, HbA1c goal < 7% (H)      Blood Pressure Monitor KIT 1 Device daily. Check BP daily  Qty: 1 kit, Refills: 0    Associated Diagnoses: Hypertension      aspirin 81 MG tablet Take 1 tablet by mouth every evening   Refills: 3       !! - Potential duplicate medications found. Please discuss with provider.      STOP taking these medications       BENAZEPRIL HCL PO Comments:   Reason for Stopping:         NIFEDIPINE PO Comments:   Reason for Stopping:             Allergies   Allergies   Allergen Reactions     Pravastatin Sodium Cramps     Effexor [Venlafaxine Hydrochloride]      Procardia [Nifedipine]      Data   Most Recent 3 CBC's:  Recent Labs   Lab Test  12/07/17   0805 12/06/17   0730  12/04/17   0818   WBC  9.3  7.2  9.9   HGB  9.1*  8.9*  9.5*   MCV  87  87  86   PLT  505*  446  304      Most Recent 3 BMP's:  Recent Labs   Lab Test  12/07/17   0805 12/06/17   0730  12/05/17   0806  12/04/17   0818   NA  141  142   --   139   POTASSIUM  3.2*  3.3*   --   3.5   CHLORIDE  105  107   --   102   CO2  27  28   --   29   BUN  14  21   --   29   CR  1.36*  1.48*  1.71*  2.20*   ANIONGAP  9  7   --   8   CHAIM  8.4*  8.2*   --   8.3*   GLC  206*  165*   --   139*     Most Recent 2 LFT's:  Recent Labs   Lab Test  04/29/13   1400  01/28/13   1540  12/07/12   0847   AST  77*   --   46*   ALT  45  44  40   ALKPHOS  96   --   88   BILITOTAL  0.8   --   1.0     Most Recent INR's and Anticoagulation Dosing History:  Anticoagulation Dose History     There is no flowsheet data to display.        Most Recent 3 Troponin's:No lab results found.  Most Recent Cholesterol Panel:  Recent Labs   Lab Test  04/29/13   1400   09/06/12   0917   CHOL   --    --   168   LDL  206*   < >  91   HDL   --    --   44*   TRIG   --    --   165*    < > = values in  this interval not displayed.     Most Recent 6 Bacteria Isolates From Any Culture (See EPIC Reports for Culture Details):  Recent Labs   Lab Test  12/02/17   1312  12/01/17   2025  12/01/17   2020  05/04/11   1326  04/25/11   0938  06/25/10   1016   CULT  Heavy growth  Staphylococcus aureus  *  Culture negative monitoring continues  No growth  No growth  <10,000 colonies/mL Staphylococcus aureus Plus 10 to 50,000 colonies/mL Multiple species present, probable perineal  contamination.  10 to 50,000 colonies/mL Multiple species present, probable perineal  contamination.  10 to 50,000 colonies/mL Multiple species present, probable perineal  contamination.     Most Recent TSH, T4 and A1c Labs:  Recent Labs   Lab Test  12/02/17   0719   01/28/13   1540  12/07/12   0847   TSH   --    --   2.57  10.50*   T4   --    --    --   0.91   A1C  6.7*   < >  7.4*   --     < > = values in this interval not displayed.     Results for orders placed or performed during the hospital encounter of 12/01/17   CT Lumbar Spine w/o Contrast    Narrative    CT LUMBAR SPINE WITHOUT CONTRAST 12/2/2017 9:49 AM     HISTORY: Patient with epidural abscess, assess fusion and loosening of  screws.    TECHNIQUE: Axial images were obtained through the lumbar spine without  contrast. Coronal and sagittal reconstructions were also acquired.  Radiation dose for this scan was reduced using automated exposure  control, adjustment of the mA and/or kV according to patient size, or  iterative reconstruction technique.    COMPARISON: Outside MR dated 12/1/2017.    FINDINGS: There has been previous posterior decompression fusion and  instrumentation from L4 to S1. Transpedicular screws and rods are seen  at L4, L5, and S1 bilaterally. There is some minimal lucency around  the L4 screws bilaterally. The L5 and S1 screws do not show any  lucencies. Posterior alignment is normal. Disc space narrowing is  present at L5-S1.    T12-L1: Normal.    L1-L2:  Normal.    L2-L3: Minimal disc bulging and ligamentum flavum hypertrophy. No  significant stenosis.    L3-L4: The disc space is better evaluated on the MR on the day before  due to better soft tissue contrast. There is abnormal soft tissue  protruding and compressing the thecal sac posteriorly which is between  2 abnormal facet joints which both contain fluid. There has been  previous posterior decompression at this level in the midline. The  abnormal soft tissue is protruding from this region in towards the  canal causing moderate central canal stenosis. There is no foraminal  stenosis. The abnormal soft tissue/fluid collection is associated with  slightly larger more posterior fluid collection and some abnormal  signal intensity within the muscles on MR study. The left L4 pedicle  is abnormal signal intensity on MR but does not show any sclerosis or  any bony erosive changes on CT.    L4-L5: Previous posterior instrumentation and fusion. No definite  solid fusion is present but this is difficult to assess as there is  some metallic artifact. The most bony bridging is on the right joints.  There is moderate bilateral neural foraminal stenosis but no central  canal stenosis.    L5-SI: There has been prior posterior instrumentation fusion. There  appears to be solid bony bridging along the right facet joints. There  is no central canal stenosis. There is mild bilateral degenerative  foraminal stenosis.    Paraspinal soft tissues: Vascular calcifications noted in the aorta.      Impression    IMPRESSION:  1. Prior posterior decompressive procedure at L3-L4 with abnormal  fluid and/or soft tissue protruding posteriorly into the canal causing  moderate central canal stenosis. There is quite a bit of paraspinal  edematous changes in the adjacent muscle suggesting possibility this  could represent an infective abscess. There is no definite evidence  for osteomyelitis. While there is abnormal signal intensity in the  left  L4 pedicle on MR this could just be due to edema or bony reaction  as there may be some loosening of both the left and right L4 screws.  2. Prior posterior instrumentation fusion from L4 to S1 the fusion  appears solid on the right at L5-S1 but there is no definite bony  bridging at L4-L5 but this is difficult to assess.    MARLIUZ DONALDSON MD   XR Lumbar Spine 2/3 Views    Narrative    XR LUMBAR SPINE 2-3 VIEWS 12/2/2017 9:59 AM    HISTORY: preop xrays;       Impression    IMPRESSION: Postoperative changes lower lumbar spine. Exam otherwise  negative.    LINDSAY PLATA MD   XR Surgery SCOUT L/T 5 Min Fluoro w Stills    Narrative    XR SURGERY SCOUT FLUORO LESS THAN 5 MIN W STILLS 12/2/2017 3:35 PM     COMPARISON:    HISTORY: Lumbar Spine Hardware Removal, AAG, [id number], Fluoro Time:  0min, 2 Images, C-arm #4.;     NUMBER OF IMAGES ACQUIRED: 2    VIEWS: 2    FLUOROSCOPY TIME: 0      Impression    IMPRESSION: C-arm fluoroscopy was utilized there the procedure on the  lumbar spine.    CATHY NOLEN MD   MR Lumbar Spine w/o Contrast    Narrative    MR LUMBAR SPINE WITHOUT NUVKSUQQ39/3/2017 7:38 PM      HISTORY: Status post epidural abscess evacuation, check to make sure  adequate decompression.     TECHNIQUE:  Multiplanar, multisequence MRI of the lumbar spine without  contrast. No contrast was administered because of the patient's low  GFR.    COMPARISON: CT dated 12/2/2017    FINDINGS:This report assumes five lumbar type vertebrae.     Patient is status post a posterior decompression and fusion with  instrumentation extending from L4-S1. The patient is status post a new  laminectomy at the L3-4 level. There is a complex collection in the  posterior epidural space at the L3-4 level. This is low in signal on  T1 and high in signal intensity on T2. It extends from the mid L3  level to the mid L4 level. It measures about 4 cm in cephalocaudad  dimension. It is causing mass effect on the posterior dural sac. The  residual AP  diameter of the central canal at this level is about 7 mm.  This material is nonspecific. It could represent postoperative  hemorrhage but it could also be due to residual epidural abscess.  Postop change is seen in the posterior soft tissues extending from  L2-L5.    L1-L2:  Degeneration of the disc. Mild annular disc bulge.    L2-L3:  Degeneration of the disc. Mild annular disc bulge.    L3-L4:  Degeneration of the disc. Mild annular disc bulge. Posterior  laminectomy. Soft tissue material in the posterior epidural space  causing mild mass effect on the dural sac. This could represent  postoperative hematoma but a residual epidural abscess cannot be  excluded.    L4-L5:  Posterior instrumentation. Degeneration of the disc. Mild  bulge.    L5-S1:  Degeneration of the disc. Posterior instrumentation.      Impression    IMPRESSION:   1. New postoperative change at the L3-4 level. Patient status post a  laminectomy. Complex material in the posterior epidural space. This  could represent postoperative hemorrhage. Residual epidural abscess  cannot be excluded. This is causing moderate central stenosis.  2. Posterior instrumentation extending from L4-sacrum.   3. No contrast was administered because of the patient's low GFR.    CATHY NOLEN MD   MR Lumbar Spine w/o Contrast    Addendum: 12/6/2017    [facility]  Accession # [id number]    The original report on this patient was dictated by me. I reviewed  these images with Dr. Jay. He stated that at the time of surgery on  12/4/2017, the soft tissue area in the posterior epidural space and  surgical bed did not demonstrate any abscess, hematoma, or drainable  fluid collection. Rather, it had the appearance of inflammation and  fibrosis. Since this area has a similar appearance on today's MRI when  compared to 12/3/2017, I suspect this is the same process with no  current abscess, hematoma, or drainable fluid collection. In addition,  the previous MRI demonstrated a small  synovial cyst arising from the  anterior medial aspect of the left L3-L4 facet joint. This is no  longer present on today's MRI. No additional change is seen.    ROLAN SAUL MD (Date of Addendum: 12/6/2017 8:22 AM)      ROLAN SAUL MD      Narrative    MRI LUMBAR SPINE WITHOUT CONTRAST December 6, 2017 6:47 AM     HISTORY: Follow up on decompression of epidural abscess. Surgery on  12/2/2017 and 12/4/2017.    COMPARISON: An MRI on 12/3/2017.    TECHNIQUE: Multiplanar MR imaging was performed without contrast.    FINDINGS: Again seen are surgical changes of posterior decompression  and fusion from L4 to S1 and posterior decompression at L3-L4. This is  unchanged. Again seen is a complex collection in the posterior  epidural space extending into the more posterior surgical bed at the  level of the L3-L4 disc. This is quite heterogeneous but predominantly  decreased in signal intensity on T1 and increased signal on the T2 and  STIR sequences. This is best seen on the sagittal series 701 image 8.  This again measures up to 4.2 cm craniocaudal and results in mass  effect upon the thecal sac with moderate central canal stenosis. The  overall appearance is quite similar to the previous examination. No  definite change is seen since the previous examination.      Impression    IMPRESSION: Despite surgery on 12/4/2017, I see no significant change  since an MRI on 12/3/2017. This includes surgical changes and complex  material in the posterior epidural space at L3-L4. This again could  represent either an epidural abscess or hematoma. This again results  in moderate central canal stenosis.    ROLAN SAUL MD

## 2017-12-07 NOTE — PROGRESS NOTES
Pt discharged home with home care (IV antibiotics) via wheelchair with. RX and paperwork given to pt. PICC patent and dressing intact. All questions answered.

## 2017-12-07 NOTE — DISCHARGE INSTRUCTIONS
Your doctor has ordered IV antibiotics after your hospital stay.  This service will be provided by [facility]. They will contact you regarding your first visit.   If you have any questions about this service, please call them at [phone].    A follow-up appointment was scheduled for you [see above].  It is very important to go to it--bring these papers and your insurance card with you.  At the visit, talk about your hospital stay.  Tell your doctor how you feel.  Show your new list of medications.  Your doctor will update records, make sure you are still doing OK, and decide if any tests or medication changes are needed.                  Care after a Discectomy/Decompression - Dr. Vishal Jay    The following information will help you through your recovery at home.  Pain    It is normal for you to experience some pain in the area of your incision after your surgery.  Some of your leg pain may go away immediately after your surgery.  Some of the pain will gradually decrease over the next few days or weeks depending on the amount of inflammation present in the nerve.      Sometimes Dr. Jay will place a steroid preparation on the nerve during surgery.   This may help decrease the nerve inflammation for the first few days after surgery.  If some of your leg pain returns 3 to 5 days after surgery it may be due to the steroid wearing off.  The pain should not be as bad as your pre-op pain and will diminish over a period of weeks.      You should call Dr. Jay s office if your leg pain returns suddenly and does not improve over 24 hours.    Activity    You may increase your activity as tolerated; walking is the best form of exercise after spine surgery.      Avoid bending, lifting, and twisting (BLT).  Avoid activities such as vacuuming, raking and shoveling.    Try to limit your lifting to 10-15lbs during the first 2 weeks and then increase to 20-25lbs over the next 6 weeks.    You may return to work  approximately 1- 2 weeks after your surgery, if you have a sedentary or desk type job.  If you have a physical job Dr. Jay and his staff will help you determine a return to work plan.      You may resume sexual activity when you feel ready.  Stop if you have pain.    Driving    You may drive if you feel strong enough and are not taking any narcotic pain medications.    Incision Site     The first 3 days after surgery Dr. Jay would like you to keep your incision dry.   You may take a sponge bath during this time or cover your dressing with a transparent material called Tegaderm (sold at most pharmacies).      The first 3 days after surgery you should change your gauze dressing at least once a day.  After 3 days you may remove the gauze dressing and leave it off, at this point you may shower without covering your incision, allow water and soap to run over your incision but do not scrub the area or soak in a tub.    Your incision is covered with steri-strips (narrow white tapes); they will get loose and fall of in 7-10 days.  If they do not fall off after 10 days you may remove them or Dr. Jay s staff will remove them at your post-op visit.    Most patients have dissolvable stitches which do not need to be removed.  In some cases nylon stitches are used and they need to be removed, 10-14 days after surgery.  If you have staples or nylon sutures please call for a removal appointment with Dr. Jay s nurse.    Pain Management     Take your prescribed pain medication as needed and directed.  Use Tylenol and Ibuprofen for your discomfort when you no longer need the narcotic pain medication.      If you need a refill on your pain medication call [phone], please allow 24 hours for your prescription to be refilled, Dr. Jay s office does not refill pain medications on Friday.   Diet     Eat a healthy diet; this will help your recovery.    Drink plenty of fluids, water, milk or juice.    If you have trouble with  constipation you should eat more fiber, drink more fluids, increase your walking or try an over the counter laxative.    Follow-up Visits    You should have a post-op appointment that was scheduled for you at the same time your surgery was scheduled. If you do not, please call Dr. Jay s office when you get home from your surgery.    Write down any questions you have about your surgery, recovery, return to work and other topics you wished to be covered at your post-op visit.  This way, we will be able to address all of your questions at your next visit.    Call Dr. Jay s office if you have any questions or concerns.    When to Call your Doctor:    If you have any redness, warmth or swelling at the incision site.    If your incision opens up.    If you have increasing drainage from your incision.    If you have a temperature greater than 100.5 degrees Fahrenheit.    [address] [place] [address]  Phone: [phone]  Fax: [phone]  Web site: [email or web address]

## 2017-12-07 NOTE — PLAN OF CARE
Problem: Patient Care Overview  Goal: Plan of Care/Patient Progress Review  Outcome: Improving  A&O x 4. CMS intact. Bowel sounds active, passing flatus. High bp decreased within given parameter with prn iv hydralazine. Also took her scheduled bp meds at bedtime. Dressing re-enforced CDI. Up with 1 with belt and walker. Voiding adequately using the bathroom. Denied pain. Bedtime blood sugar 150.Plan discharge home with  assist/TCU pending.

## 2017-12-07 NOTE — PROGRESS NOTES
Care Coordination:    Noted Spinal Surgery note indicating plan for d/c, and Care Coordinator notes indicating plan for IV abx covered at TCU vs. out of pocket cost of $20,000-24,000 if pt wishes to do them at home.    Met with patient in room, introduced self and role.  She and spouse confirm they wish to pay the out of pocket / home infusion cost.     Provided FYI page to MDs: hospitalist Dr. Lemos that pt plans to d/c home, and paged admitting provider / surgeon Dr. Jay that d/c orders including Home Infusion Referral will be needed in order for IV care at home to be arranged.  + Surgery PA returned call, discussed Dr. Jay is listed in EPIC as admitting / attending, but Hospitalist may have been actual admitting.  Surgery will write the orders they can, and Hospitalist & ID to write remaining.    Home Infusion aware of impending discharge, and provided [facility] cost estimate for IV antibiotics self-pay acknowledgement forms for pt to sign.  + Met with pt, reviewed forms, faxed signed forms to [facility], and placed original on pt chart.  + Ensured [facility] contact on d/c instructions: [phone]    Scheduled Follow Up appointments and placed on AVS:   Your appointments are scheduled as follows (you need weekly CBC & BMPs, so please be sure the doctors write orders to have these drawn at the appointment):     Wed Dec 13 at 8:30am   Dr. Anthony, Primary Care      [facility]      [address]      [place] [address]    [phone]     Wed Dec 20 at 10:45am   Dr. Perez, Infectious Disease      [facility]      [address]       [place] [address] [phone]     Thurs Dec 21 at 9:40am   Dr. Jay, Orthopedic Surgery      [address], [address]      [place], [place]   [phone]     Joselin Mckeon RN, BSN  [facility] Care Coordinator   Mobile Phone: [phone]

## 2017-12-08 ENCOUNTER — HOME INFUSION (PRE-WILLOW HOME INFUSION) (OUTPATIENT)
Dept: PHARMACY | Facility: CLINIC | Age: 72
End: 2017-12-08

## 2017-12-08 NOTE — PROGRESS NOTES
This is a recent snapshot of the patient's [facility] medical record.  For current drug dose and complete information and questions, call [phone] or In Basket pool, fv home infusion ([id number])  CSN Number:  [id number]

## 2017-12-09 LAB
BACTERIA SPEC CULT: ABNORMAL
BACTERIA SPEC CULT: NORMAL
Lab: NORMAL
SPECIMEN SOURCE: ABNORMAL
SPECIMEN SOURCE: NORMAL

## 2017-12-11 NOTE — PROGRESS NOTES
This is a recent snapshot of the patient's [facility] medical record.  For current drug dose and complete information and questions, call [phone] or In Basket pool, [facility] home infusion ([id number])  CSN Number:  [id number]

## 2017-12-12 ENCOUNTER — HOME INFUSION (PRE-WILLOW HOME INFUSION) (OUTPATIENT)
Dept: PHARMACY | Facility: CLINIC | Age: 72
End: 2017-12-12

## 2017-12-13 NOTE — PROGRESS NOTES
This is a recent snapshot of the patient's [facility] medical record.  For current drug dose and complete information and questions, call [phone] or In Basket pool, [facility] home infusion ([id number])  CSN Number:  [id number]

## 2017-12-18 ENCOUNTER — HOME INFUSION (PRE-WILLOW HOME INFUSION) (OUTPATIENT)
Dept: PHARMACY | Facility: CLINIC | Age: 72
End: 2017-12-18

## 2017-12-19 NOTE — PROGRESS NOTES
This is a recent snapshot of the patient's [facility] medical record.  For current drug dose and complete information and questions, call [phone] or In Basket pool, [facility]v home infusion ([id number])  CSN Number:  [id number]

## 2017-12-21 ENCOUNTER — HOME INFUSION (PRE-WILLOW HOME INFUSION) (OUTPATIENT)
Dept: PHARMACY | Facility: CLINIC | Age: 72
End: 2017-12-21

## 2017-12-22 NOTE — PROGRESS NOTES
This is a recent snapshot of the patient's [facility] medical record.  For current drug dose and complete information and questions, call [phone] or In Basket pool, fv home infusion ([id number])  CSN Number:  [id number]

## 2017-12-28 ENCOUNTER — HOME INFUSION (PRE-WILLOW HOME INFUSION) (OUTPATIENT)
Dept: PHARMACY | Facility: CLINIC | Age: 72
End: 2017-12-28

## 2017-12-29 NOTE — PROGRESS NOTES
This is a recent snapshot of the patient's [facility] medical record.  For current drug dose and complete information and questions, call [phone] or In Basket pool, fv home infusion ([id number])  CSN Number:  [id number]

## 2018-01-09 ENCOUNTER — HOME INFUSION (PRE-WILLOW HOME INFUSION) (OUTPATIENT)
Dept: PHARMACY | Facility: CLINIC | Age: 73
End: 2018-01-09

## 2018-01-10 NOTE — PROGRESS NOTES
This is a recent snapshot of the patient's [facility] medical record.  For current drug dose and complete information and questions, call [phone] or In Basket pool, fv home infusion ([id number])  CSN Number:  [id number]

## 2018-01-20 ENCOUNTER — HOME INFUSION (PRE-WILLOW HOME INFUSION) (OUTPATIENT)
Dept: PHARMACY | Facility: CLINIC | Age: 73
End: 2018-01-20

## 2018-01-24 NOTE — PROGRESS NOTES
This is a recent snapshot of the patient's [facility] medical record.  For current drug dose and complete information and questions, call [phone] or In Basket pool, fv home infusion ([id number])  CSN Number:  [id number]

## 2018-01-29 ENCOUNTER — HOME INFUSION (PRE-WILLOW HOME INFUSION) (OUTPATIENT)
Dept: PHARMACY | Facility: CLINIC | Age: 73
End: 2018-01-29

## 2018-01-30 NOTE — PROGRESS NOTES
This is a recent snapshot of the patient's [facility] medical record.  For current drug dose and complete information and questions, call [phone] or In Basket pool, fv home infusion ([id number])  CSN Number:  [id number]

## (undated) DEVICE — CUSHION INSERT LG PRONE VIEW JACKSON TABLE

## (undated) DEVICE — PREP CHLORAPREP 26ML TINTED ORANGE  260815

## (undated) DEVICE — GLOVE PROTEXIS POWDER FREE 6.5 ORTHOPEDIC 2D73ET65

## (undated) DEVICE — SU ETHILON 2-0 FS 18" 664H

## (undated) DEVICE — SPECIMEN CULTURETTE DBL SWAB 220109

## (undated) DEVICE — PREP SKIN SCRUB TRAY 4461A

## (undated) DEVICE — PAD POSITIONING KIT HIP DISP 5844-17

## (undated) DEVICE — SU VICRYL 3-0 PS-1 18" UND J683

## (undated) DEVICE — SU VICRYL 1 CT 36" J959H

## (undated) DEVICE — BLADE KNIFE SURG 15 371115

## (undated) DEVICE — DRAIN HEMOVAC RESERVOIR KIT 10FR 1/8" MED 00-2550-002-10

## (undated) DEVICE — DRSG GAUZE 4X4" 3033

## (undated) DEVICE — GLOVE PROTEXIS BLUE W/NEU-THERA 6.5  2D73EB65

## (undated) DEVICE — SU VICRYL 1 MO-4 18" J702D

## (undated) DEVICE — PACK SPINE SM CUSTOM SNE15SSFSK

## (undated) DEVICE — PAD POSITIONING KIT CHEST SHEARGUARD DISP LF 5844-13

## (undated) DEVICE — DRAPE STERI TOWEL LG 1010

## (undated) DEVICE — SUCTION IRR SYSTEM W/O TIP INTERPULSE HANDPIECE 0210-100-000

## (undated) DEVICE — ESU GROUND PAD UNIVERSAL W/O CORD

## (undated) DEVICE — PACK LARGE SPINE SNE15LSFSE

## (undated) DEVICE — DRSG ABDOMINAL 07 1/2X8" 7197D

## (undated) DEVICE — ESU ELEC BLADE 4" COATED

## (undated) DEVICE — SU VICRYL 2-0 CT-1 27" J339H

## (undated) DEVICE — BONE CLEANING TIP INTERPULSE  0210-010-000

## (undated) DEVICE — TUBING SUCTION SOFT 20'X3/16" 0036570

## (undated) DEVICE — GLOVE PROTEXIS BLUE W/NEU-THERA 7.5  2D73EB75

## (undated) DEVICE — GLOVE PROTEXIS POWDER FREE 8.0 ORTHOPEDIC 2D73ET80

## (undated) DEVICE — MANIFOLD NEPTUNE 4 PORT 700-20

## (undated) DEVICE — SOL NACL 0.9% IRRIG 3000ML BAG 2B7477

## (undated) DEVICE — DRAPE MAYO STAND 23X54 8337

## (undated) DEVICE — DRAPE SHEET REV FOLD 3/4 9349

## (undated) DEVICE — DRSG ADAPTIC 3X8" 6113

## (undated) DEVICE — SU VICRYL 2-0 CT 36" J357H

## (undated) DEVICE — DRAPE POUCH INSTRUMENT 1018

## (undated) DEVICE — LINEN TOWEL PACK X5 5464

## (undated) DEVICE — MIDAS REX DISSECTING TOOL  14MH30

## (undated) DEVICE — SPONGE SURGIFOAM 50

## (undated) DEVICE — STPL SKIN 35W APPOSE 8886803712

## (undated) DEVICE — SU VICRYL 1 CT-1 27" J341H

## (undated) RX ORDER — CEFAZOLIN SODIUM 1 G/3ML
INJECTION, POWDER, FOR SOLUTION INTRAMUSCULAR; INTRAVENOUS
Status: DISPENSED
Start: 2017-12-02

## (undated) RX ORDER — FENTANYL CITRATE 50 UG/ML
INJECTION, SOLUTION INTRAMUSCULAR; INTRAVENOUS
Status: DISPENSED
Start: 2017-12-04

## (undated) RX ORDER — LIDOCAINE HYDROCHLORIDE 20 MG/ML
INJECTION, SOLUTION EPIDURAL; INFILTRATION; INTRACAUDAL; PERINEURAL
Status: DISPENSED
Start: 2017-12-04

## (undated) RX ORDER — LIDOCAINE HYDROCHLORIDE 20 MG/ML
INJECTION, SOLUTION EPIDURAL; INFILTRATION; INTRACAUDAL; PERINEURAL
Status: DISPENSED
Start: 2017-12-02

## (undated) RX ORDER — PROPOFOL 10 MG/ML
INJECTION, EMULSION INTRAVENOUS
Status: DISPENSED
Start: 2017-12-04

## (undated) RX ORDER — FENTANYL CITRATE 50 UG/ML
INJECTION, SOLUTION INTRAMUSCULAR; INTRAVENOUS
Status: DISPENSED
Start: 2017-12-02

## (undated) RX ORDER — CEFAZOLIN SODIUM 2 G/100ML
INJECTION, SOLUTION INTRAVENOUS
Status: DISPENSED
Start: 2017-12-04

## (undated) RX ORDER — ONDANSETRON 2 MG/ML
INJECTION INTRAMUSCULAR; INTRAVENOUS
Status: DISPENSED
Start: 2017-12-04

## (undated) RX ORDER — PROPOFOL 10 MG/ML
INJECTION, EMULSION INTRAVENOUS
Status: DISPENSED
Start: 2017-12-02

## (undated) RX ORDER — GINSENG 100 MG
CAPSULE ORAL
Status: DISPENSED
Start: 2017-12-04

## (undated) RX ORDER — HYDROMORPHONE HYDROCHLORIDE 1 MG/ML
INJECTION, SOLUTION INTRAMUSCULAR; INTRAVENOUS; SUBCUTANEOUS
Status: DISPENSED
Start: 2017-12-04

## (undated) RX ORDER — HYDROMORPHONE HYDROCHLORIDE 1 MG/ML
INJECTION, SOLUTION INTRAMUSCULAR; INTRAVENOUS; SUBCUTANEOUS
Status: DISPENSED
Start: 2017-12-02

## (undated) RX ORDER — GLYCOPYRROLATE 0.2 MG/ML
INJECTION, SOLUTION INTRAMUSCULAR; INTRAVENOUS
Status: DISPENSED
Start: 2017-12-04

## (undated) RX ORDER — CEFAZOLIN SODIUM 2 G/100ML
INJECTION, SOLUTION INTRAVENOUS
Status: DISPENSED
Start: 2017-12-02

## (undated) RX ORDER — GINSENG 100 MG
CAPSULE ORAL
Status: DISPENSED
Start: 2017-12-02

## (undated) RX ORDER — DEXAMETHASONE SODIUM PHOSPHATE 4 MG/ML
INJECTION, SOLUTION INTRA-ARTICULAR; INTRALESIONAL; INTRAMUSCULAR; INTRAVENOUS; SOFT TISSUE
Status: DISPENSED
Start: 2017-12-04

## (undated) RX ORDER — ONDANSETRON 2 MG/ML
INJECTION INTRAMUSCULAR; INTRAVENOUS
Status: DISPENSED
Start: 2017-12-02

## (undated) RX ORDER — GLYCOPYRROLATE 0.2 MG/ML
INJECTION, SOLUTION INTRAMUSCULAR; INTRAVENOUS
Status: DISPENSED
Start: 2017-12-02